# Patient Record
Sex: FEMALE | Race: WHITE | Employment: FULL TIME | URBAN - METROPOLITAN AREA
[De-identification: names, ages, dates, MRNs, and addresses within clinical notes are randomized per-mention and may not be internally consistent; named-entity substitution may affect disease eponyms.]

---

## 2017-11-01 ENCOUNTER — OFFICE VISIT (OUTPATIENT)
Dept: FAMILY MEDICINE | Facility: CLINIC | Age: 47
End: 2017-11-01
Payer: COMMERCIAL

## 2017-11-01 VITALS
BODY MASS INDEX: 26.53 KG/M2 | WEIGHT: 155.4 LBS | TEMPERATURE: 98.6 F | HEART RATE: 55 BPM | DIASTOLIC BLOOD PRESSURE: 67 MMHG | HEIGHT: 64 IN | SYSTOLIC BLOOD PRESSURE: 106 MMHG | OXYGEN SATURATION: 98 %

## 2017-11-01 DIAGNOSIS — H65.02 ACUTE SEROUS OTITIS MEDIA OF LEFT EAR, RECURRENCE NOT SPECIFIED: Primary | ICD-10-CM

## 2017-11-01 DIAGNOSIS — Z23 NEED FOR PROPHYLACTIC VACCINATION AND INOCULATION AGAINST INFLUENZA: ICD-10-CM

## 2017-11-01 PROCEDURE — 99213 OFFICE O/P EST LOW 20 MIN: CPT | Mod: 25 | Performed by: NURSE PRACTITIONER

## 2017-11-01 PROCEDURE — 90471 IMMUNIZATION ADMIN: CPT | Performed by: NURSE PRACTITIONER

## 2017-11-01 PROCEDURE — 90686 IIV4 VACC NO PRSV 0.5 ML IM: CPT | Performed by: NURSE PRACTITIONER

## 2017-11-01 RX ORDER — FLUTICASONE PROPIONATE 50 MCG
1 SPRAY, SUSPENSION (ML) NASAL 2 TIMES DAILY
Qty: 1 BOTTLE | Refills: 11 | Status: SHIPPED | OUTPATIENT
Start: 2017-11-01 | End: 2019-05-07

## 2017-11-01 NOTE — MR AVS SNAPSHOT
After Visit Summary   11/1/2017    Roxy Ross    MRN: [id number]           Patient Information     Date Of Birth          1970        Visit Information        Provider Department      11/1/2017 1:20 PM Camille Mackenzie APRN [facility]        Today's Diagnoses     Acute serous otitis media of left ear, recurrence not specified    -  1      Care Instructions      Common Middle Ear Problems    Your middle ear may have been injured or infected recently. Over time, certain growths or bone disease can also harm the middle ear. Left untreated, middle ear problems often lead to lifelong hearing loss. There are two types of hearing loss: conductive and sensorineural. One or both kinds can occur. Injury, infection, certain growths, or bone disease can cause your symptoms. A ruptured eardrum or a long-lasting (chronic) ear infection may be painful and decrease hearing.  Symptoms    Hearing loss in one or both ears    Fluid, often smelly, draining from the ear    Pain, pressure, or discomfort in the ear    Ringing in the ear  Conductive and sensorineural hearing loss  Sound waves may be disrupted before they reach the inner ear. If this happens, conductive hearing loss may occur. The ear canal can be blocked by wax, infection, a tumor, or a foreign object. The eardrum can be injured or infected. Abnormal bone growth, infection, or tumors in the middle ear can block sound waves.  Sound waves may not be processed correctly in the inner ear. If this happens, sensorineural hearing loss may occur. Permanent hearing loss is most commonly associated with sensorineural problems.  The tests and evaluations used to diagnose what type of hearing problem you have will depend on your symptoms.   Date Last Reviewed: 10/1/2016    1299-9468 [employer]. [address] [place] [address]. All rights reserved. This information is not intended as a substitute for  professional medical care. Always follow your healthcare professional's instructions.                Follow-ups after your visit        Additional Services     OTOLARYNGOLOGY REFERRAL       Your provider has referred you to: KJ: [facility] [phone]   [email or web address]    Please be aware that coverage of these services is subject to the terms and limitations of your health insurance plan.  Call member services at your health plan with any benefit or coverage questions.      Please bring the following with you to your appointment:    (1) Any X-Rays, CTs or MRIs which have been performed.  Contact the facility where they were done to arrange for  prior to your scheduled appointment.   (2) List of current medications  (3) This referral request   (4) Any documents/labs given to you for this referral                  Who to contact     If you have questions or need follow up information about today's clinic visit or your schedule please contact [facility] directly at [phone].  Normal or non-critical lab and imaging results will be communicated to you by Zondlehart, letter or phone within 4 business days after the clinic has received the results. If you do not hear from us within 7 days, please contact the clinic through My Ad Boxt or phone. If you have a critical or abnormal lab result, we will notify you by phone as soon as possible.  Submit refill requests through [employer] L[employer] or call your pharmacy and they will forward the refill request to us. Please allow 3 business days for your refill to be completed.          Additional Information About Your Visit        Buzz Lanes Information     [employer] gives you secure access to your electronic health record. If you see a primary care provider, you can also send messages to your care team and make appointments. If you have questions, please call your primary care clinic.  If you do not have a primary care  "provider, please call [phone] and they will assist you.        Care EveryWhere ID     This is your Care EveryWhere ID. This could be used by other organizations to access your [place] medical records  [id number]        Your Vitals Were     Pulse Temperature Height Pulse Oximetry BMI (Body Mass Index)       55 98.6  F (37  C) (Tympanic) 5' 4\" (1.626 m) 98% 26.67 kg/m2        Blood Pressure from Last 3 Encounters:   11/01/17 106/67   12/08/16 120/72   11/29/16 136/83    Weight from Last 3 Encounters:   11/01/17 155 lb 6.4 oz (70.5 kg)   12/08/16 155 lb (70.3 kg)   11/29/16 156 lb 9.6 oz (71 kg)              We Performed the Following     OTOLARYNGOLOGY REFERRAL          Today's Medication Changes          These changes are accurate as of: 11/1/17  1:46 PM.  If you have any questions, ask your nurse or doctor.               Start taking these medicines.        Dose/Directions    fluticasone 50 MCG/ACT spray   Commonly known as:  FLONASE   Used for:  Acute serous otitis media of left ear, recurrence not specified   Started by:  Camille Mackenzie APRN CNP        Dose:  1 spray   Spray 1 spray into both nostrils 2 times daily Left nostril twice daily.   Quantity:  1 Bottle   Refills:  11            Where to get your medicines      These medications were sent to [facility] [address] [place] [address] [address] AT [facility] OF [address] [place] [address]  [address] [address]     Phone:  [phone]     fluticasone 50 MCG/ACT spray                Primary Care Provider Office Phone # Fax #    Salima Junior [phone]       [address] [place] [address]        Equal Access to Services     MIGUELITO PAGAN AH: Adrianna Al, waaxda luqadaha, qaybta kaalmada savannahyaisaak, bud garza. So [facility] [phone].    ATENCIÓN: Si habla español, tiene a osborne disposición servicios gratuitos de asistencia lingüística. Llame al " [phone].    We comply with applicable federal civil rights laws and [place] laws. We do not discriminate on the basis of race, color, national origin, age, disability, sex, sexual orientation, or gender identity.            Thank you!     Thank you for choosing [facility]  for your care. Our goal is always to provide you with excellent care. Hearing back from our patients is one way we can continue to improve our services. Please take a few minutes to complete the written survey that you may receive in the mail after your visit with us. Thank you!             Your Updated Medication List - Protect others around you: Learn how to safely use, store and throw away your medicines at www.disposemymeds.org.          This list is accurate as of: 11/1/17  1:46 PM.  Always use your most recent med list.                   Brand Name Dispense Instructions for use Diagnosis    fluticasone 50 MCG/ACT spray    FLONASE    1 Bottle    Spray 1 spray into both nostrils 2 times daily Left nostril twice daily.    Acute serous otitis media of left ear, recurrence not specified       NO ACTIVE MEDICATIONS      .    Brachial neuritis or radiculitis NOS

## 2017-11-01 NOTE — PROGRESS NOTES

## 2017-11-01 NOTE — PATIENT INSTRUCTIONS
Common Middle Ear Problems    Your middle ear may have been injured or infected recently. Over time, certain growths or bone disease can also harm the middle ear. Left untreated, middle ear problems often lead to lifelong hearing loss. There are two types of hearing loss: conductive and sensorineural. One or both kinds can occur. Injury, infection, certain growths, or bone disease can cause your symptoms. A ruptured eardrum or a long-lasting (chronic) ear infection may be painful and decrease hearing.  Symptoms    Hearing loss in one or both ears    Fluid, often smelly, draining from the ear    Pain, pressure, or discomfort in the ear    Ringing in the ear  Conductive and sensorineural hearing loss  Sound waves may be disrupted before they reach the inner ear. If this happens, conductive hearing loss may occur. The ear canal can be blocked by wax, infection, a tumor, or a foreign object. The eardrum can be injured or infected. Abnormal bone growth, infection, or tumors in the middle ear can block sound waves.  Sound waves may not be processed correctly in the inner ear. If this happens, sensorineural hearing loss may occur. Permanent hearing loss is most commonly associated with sensorineural problems.  The tests and evaluations used to diagnose what type of hearing problem you have will depend on your symptoms.   Date Last Reviewed: 10/1/2016    [id number] [employer]. [address] [place] [address], [place] [address]. All rights reserved. This information is not intended as a substitute for professional medical care. Always follow your healthcare professional's instructions.

## 2017-11-01 NOTE — PROGRESS NOTES
SUBJECTIVE:   Roxy Ross is a 47 year old female who presents to clinic today for the following health issues:      ENT Symptoms             Symptoms: cc Present Absent Comment   Fever/Chills   x    Fatigue   x    Muscle Aches   x    Eye Irritation   x    Sneezing   x    Nasal Roberth/Drg   x    Sinus Pressure/Pain   x    Loss of smell   x    Dental pain   x    Sore Throat   x    Swollen Glands   x    Ear Pain/Fullness x   Left ear fullness, when it is quiet or at night she hears a whooshing noise with her pulse. Denies and pain or drainage.   Cough   x    Wheeze   x    Chest Pain   x    Shortness of breath   x    Rash       Other         Symptom duration:  1 1/2 weeks   Symptom severity:  mild    Treatments tried:  none   Contacts:  none       Problem list and histories reviewed & adjusted, as indicated.  Additional history: as documented    Patient Active Problem List   Diagnosis     External hemorrhoids     Family history of colon cancer     CARDIOVASCULAR SCREENING; LDL GOAL LESS THAN 160     Past Surgical History:   Procedure Laterality Date     C TOTAL ABDOM HYSTERECTOMY  5/03    Hysterectomy, Total Abdominal, fibroid     COLONOSCOPY  4/11/2011    Procedure:COLONOSCOPY; Surgeon:HECTOR CONTRERAS; Location:[facility]     HC DILATION/CURETTAGE DIAG/THER NON OB  1996    D & C     HYSTERECTOMY, PAP NO LONGER INDICATED       LAPAROSCOPIC CYSTECTOMY OVARIAN (BENIGN)  1/4/2012    Procedure:LAPAROSCOPIC CYSTECTOMY OVARIAN (BENIGN); Final Procedure Done: Laparoscopy, Peritoneal Washing, Lysis of Adhesions; Surgeon:ADRIANA REYNOLDS; Location:[facility]     LAPAROSCOPY DIAGNOSTIC (GYN)  1/4/12    Lysis of adhesions, evaluate ovaries       Social History   Substance Use Topics     Smoking status: Former Smoker     Packs/day: 0.50     Years: 5.00     Types: Cigarettes     Quit date: 9/1/1991     Smokeless tobacco: Never Used     Alcohol use Yes      Comment: beer and wine 4 a week     Family History  "  Problem Relation Age of Onset     Hypertension Mother      Thyroid Disease Mother      Lipids Father      Cancer - colorectal Father      age 72     Allergies Maternal Grandmother      asthma     HEART DISEASE Paternal Grandmother      Allergies Brother      asthma     CANCER Paternal Grandfather      stomach             Reviewed and updated as needed this visit by clinical staffTobacco  Allergies  Med Hx  Surg Hx  Fam Hx  Soc Hx      Reviewed and updated as needed this visit by Provider         ROS:  Constitutional, HEENT, cardiovascular, pulmonary, gi and gu systems are negative, except as otherwise noted.      OBJECTIVE:   /67 (BP Location: Left arm, Patient Position: Sitting, Cuff Size: Adult Regular)  Pulse 55  Temp 98.6  F (37  C) (Tympanic)  Ht 5' 4\" (1.626 m)  Wt 155 lb 6.4 oz (70.5 kg)  SpO2 98%  BMI 26.67 kg/m2  Body mass index is 26.67 kg/(m^2).  GENERAL: healthy, alert and no distress  EYES: Eyes grossly normal to inspection, PERRL and conjunctivae and sclerae normal  HENT: normal cephalic/atraumatic, right ear: normal: no effusions, no erythema, normal landmarks, left ear: clear effusion and bulging membrane, nose and mouth without ulcers or lesions, nasal mucosa edematous on left nare, rhinorrhea clear, oropharynx clear and oral mucous membranes moist  NECK: no adenopathy, no asymmetry, masses, or scars and thyroid normal to palpation  RESP: lungs clear to auscultation - no rales, rhonchi or wheezes  CV: regular rates and rhythm, normal S1 S2, no S3 or S4, no murmur, click or rub, no peripheral edema and no bruits heard - carotid.  MS: no gross musculoskeletal defects noted, no edema  SKIN: no suspicious lesions or rashes  NEURO: Normal strength and tone, sensory exam grossly normal, mentation intact, speech normal, cranial nerves 2-12 intact, DTR's normal and symmetric 2+, gait normal and Romberg normal.    Diagnostic Test Results:  none     ASSESSMENT/PLAN:       ICD-10-CM    1. " Acute serous otitis media of left ear, recurrence not specified H65.02 fluticasone (FLONASE) 50 MCG/ACT spray     OTOLARYNGOLOGY REFERRAL   2. Need for prophylactic vaccination and inoculation against influenza Z23 FLU VAC, SPLIT VIRUS IM > 3 YO (QUADRIVALENT) [18040]     Vaccine Administration, Initial [20332]       CONSULTATION/REFERRAL to ENT for persistent symptoms. RETURN TO CLINIC for new or worsening symptoms.     Patient Instructions     Common Middle Ear Problems    Your middle ear may have been injured or infected recently. Over time, certain growths or bone disease can also harm the middle ear. Left untreated, middle ear problems often lead to lifelong hearing loss. There are two types of hearing loss: conductive and sensorineural. One or both kinds can occur. Injury, infection, certain growths, or bone disease can cause your symptoms. A ruptured eardrum or a long-lasting (chronic) ear infection may be painful and decrease hearing.  Symptoms    Hearing loss in one or both ears    Fluid, often smelly, draining from the ear    Pain, pressure, or discomfort in the ear    Ringing in the ear  Conductive and sensorineural hearing loss  Sound waves may be disrupted before they reach the inner ear. If this happens, conductive hearing loss may occur. The ear canal can be blocked by wax, infection, a tumor, or a foreign object. The eardrum can be injured or infected. Abnormal bone growth, infection, or tumors in the middle ear can block sound waves.  Sound waves may not be processed correctly in the inner ear. If this happens, sensorineural hearing loss may occur. Permanent hearing loss is most commonly associated with sensorineural problems.  The tests and evaluations used to diagnose what type of hearing problem you have will depend on your symptoms.   Date Last Reviewed: 10/1/2016    [id number] [employer]. [address] [place] [address], [place] [address]. All rights reserved. This information is not  intended as a substitute for professional medical care. Always follow your healthcare professional's instructions.            RUBÉN Ellison [facility]

## 2017-11-01 NOTE — NURSING NOTE
"Chief Complaint   Patient presents with     Ear Problem       Initial /67 (BP Location: Left arm, Patient Position: Sitting, Cuff Size: Adult Regular)  Pulse 55  Temp 98.6  F (37  C) (Tympanic)  Ht 5' 4\" (1.626 m)  Wt 155 lb 6.4 oz (70.5 kg)  SpO2 98%  BMI 26.67 kg/m2 Estimated body mass index is 26.67 kg/(m^2) as calculated from the following:    Height as of this encounter: 5' 4\" (1.626 m).    Weight as of this encounter: 155 lb 6.4 oz (70.5 kg).  Medication Reconciliation: complete  "

## 2018-04-12 ENCOUNTER — OFFICE VISIT (OUTPATIENT)
Dept: FAMILY MEDICINE | Facility: CLINIC | Age: 48
End: 2018-04-12
Payer: COMMERCIAL

## 2018-04-12 VITALS
RESPIRATION RATE: 16 BRPM | HEIGHT: 65 IN | SYSTOLIC BLOOD PRESSURE: 96 MMHG | BODY MASS INDEX: 26.49 KG/M2 | HEART RATE: 56 BPM | DIASTOLIC BLOOD PRESSURE: 62 MMHG | TEMPERATURE: 98.9 F | WEIGHT: 159 LBS

## 2018-04-12 DIAGNOSIS — Z00.00 ROUTINE GENERAL MEDICAL EXAMINATION AT A HEALTH CARE FACILITY: Primary | ICD-10-CM

## 2018-04-12 DIAGNOSIS — Z13.6 CARDIOVASCULAR SCREENING; LDL GOAL LESS THAN 160: ICD-10-CM

## 2018-04-12 DIAGNOSIS — Z80.0 FAMILY HISTORY OF COLON CANCER: ICD-10-CM

## 2018-04-12 DIAGNOSIS — D23.4 BENIGN NEOPLASM OF SCALP AND SKIN OF NECK: ICD-10-CM

## 2018-04-12 DIAGNOSIS — K64.4 EXTERNAL HEMORRHOIDS: ICD-10-CM

## 2018-04-12 DIAGNOSIS — H93.12 TINNITUS, LEFT: ICD-10-CM

## 2018-04-12 LAB
CHOLEST SERPL-MCNC: 190 MG/DL
GLUCOSE SERPL-MCNC: 93 MG/DL (ref 70–99)
HDLC SERPL-MCNC: 76 MG/DL
LDLC SERPL CALC-MCNC: 102 MG/DL
NONHDLC SERPL-MCNC: 114 MG/DL
TRIGL SERPL-MCNC: 60 MG/DL

## 2018-04-12 PROCEDURE — 99396 PREV VISIT EST AGE 40-64: CPT | Performed by: FAMILY MEDICINE

## 2018-04-12 PROCEDURE — 36415 COLL VENOUS BLD VENIPUNCTURE: CPT | Performed by: FAMILY MEDICINE

## 2018-04-12 PROCEDURE — 99213 OFFICE O/P EST LOW 20 MIN: CPT | Mod: 25 | Performed by: FAMILY MEDICINE

## 2018-04-12 PROCEDURE — 82947 ASSAY GLUCOSE BLOOD QUANT: CPT | Performed by: FAMILY MEDICINE

## 2018-04-12 PROCEDURE — 80061 LIPID PANEL: CPT | Performed by: FAMILY MEDICINE

## 2018-04-12 NOTE — NURSING NOTE
"Chief Complaint   Patient presents with     Physical       Initial BP 96/62  Pulse 56  Temp 98.9  F (37.2  C) (Tympanic)  Resp 16  Ht 5' 4.5\" (1.638 m)  Wt 159 lb (72.1 kg)  BMI 26.87 kg/m2 Estimated body mass index is 26.87 kg/(m^2) as calculated from the following:    Height as of this encounter: 5' 4.5\" (1.638 m).    Weight as of this encounter: 159 lb (72.1 kg).  Medication Reconciliation: complete    "

## 2018-04-12 NOTE — MR AVS SNAPSHOT
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 Review health changes.   2. Discuss preventive care.    3. Review your medicines if your doctor prescribed any.      Get a Pap test every three years (unless you have an abnormal result and your provider advises testing more often).      If you get Pap tests with HPV test, you only need to test every 5 years, unless you have an abnormal result. You do not need a Pap test if your uterus was removed (hysterectomy) and you have not had cancer.      You should be tested each year for STDs (sexually transmitted diseases), if you're at risk.       Ask your doctor if you should have a mammogram.      Have a colonoscopy (test for colon cancer) if someone in your family has had colon cancer or polyps before age 50.       Have a cholesterol test every 5 years.       Have a diabetes test (fasting glucose) after age 45. If you are at risk for diabetes, you should have this test every 3 years.    Shots: Get a flu shot each year. Get a tetanus shot every 10 years.     Nutrition:     Eat at least 5 servings of fruits and vegetables each day.    Eat whole-grain bread, whole-wheat pasta and brown rice instead of white grains and  rice.    Talk to your provider about Calcium and Vitamin D.     Lifestyle    Exercise at least 150 minutes a week (an average of 30 minutes a day, 5 days a week). This will help you control your weight and prevent disease.    Limit alcohol to one drink per day.    No smoking.     Wear sunscreen to prevent skin cancer.    See your dentist every six months for an exam and cleaning.          Follow-ups after your visit        Additional Services     COLORECTAL SURGERY REFERRAL       Your provider has referred you to: [facility]: Colon and Rectal Surgery Clinic [facility] [phone]   [email or web address]    Referral Reason(s): Hemorrhoids  Special Concerns: None  This referral is: Elective (week +)  It is OK to leave a message on patient's voicemail.    Please be aware that coverage of these services is subject to the terms and limitations of your health insurance plan.  Call member services at your health plan with any benefit or coverage questions.      Please bring the following with you to your appointment:    (1) Any X-Rays, CTs or MRIs which have been performed.  Contact the facility where they were done to arrange for  prior to your scheduled appointment.    (2) List of current medications  (3) This referral request   (4) Any documents/labs given to you for this referral            GASTROENTEROLOGY ADULT REF PROCEDURE ONLY       Last Lab Result: No results found for: CR  Body mass index is 26.87 kg/(m^2).     Needed:  No  Language:  English    Patient will be contacted to schedule procedure.     Please be aware that coverage of these services is subject to the terms and limitations of your health insurance plan.  Call member services at your health plan with any benefit or coverage questions.  Any procedures must be performed at a [facility] facility OR coordinated by your clinic's referral office.    Please bring the following with you to your  "appointment:    (1) Any X-Rays, CTs or MRIs which have been performed.  Contact the facility where they were done to arrange for  prior to your scheduled appointment.    (2) List of current medications   (3) This referral request   (4) Any documents/labs given to you for this referral                  Who to contact     If you have questions or need follow up information about today's clinic visit or your schedule please contact [facility] directly at [phone].  Normal or non-critical lab and imaging results will be communicated to you by Source Audiohart, letter or phone within 4 business days after the clinic has received the results. If you do not hear from us within 7 days, please contact the clinic through [employer] or phone. If you have a critical or abnormal lab result, we will notify you by phone as soon as possible.  Submit refill requests through BigEvidence or call your pharmacy and they will forward the refill request to us. Please allow 3 business days for your refill to be completed.          Additional Information About Your Visit        Source [employer] Information     BigEvidence gives you secure access to your electronic health record. If you see a primary care provider, you can also send messages to your care team and make appointments. If you have questions, please call your primary care clinic.  If you do not have a primary care provider, please call [phone] and they will assist you.        Care EveryWhere ID     This is your Care EveryWhere ID. This could be used by other organizations to access your [place] medical records  [id number]        Your Vitals Were     Pulse Temperature Respirations Height BMI (Body Mass Index)       56 98.9  F (37.2  C) (Tympanic) 16 5' 4.5\" (1.638 m) 26.87 kg/m2        Blood Pressure from Last 3 Encounters:   04/12/18 96/62   11/01/17 106/67   12/08/16 120/72    Weight from Last 3 Encounters:   04/12/18 159 lb (72.1 kg)   11/01/17 155 lb 6.4 oz (70.5 kg) "   12/08/16 155 lb (70.3 kg)              We Performed the Following     COLORECTAL SURGERY REFERRAL     GASTROENTEROLOGY ADULT REF PROCEDURE ONLY     Glucose     Lipid panel reflex to direct LDL Non-fasting        Primary Care Provider Office Phone # Fax #    Salima Junior [phone] [address] [facility] [address]        Equal Access to Services     HAMIDA PAGAN : Hadii aad ku hadasho Soomaali, waaxda luqadaha, qaybta kaalmada adeegyada, waxay idiin hayaan adeeg kharash la'aan . So [facility] [phone].    ATENCIÓN: Si habla español, tiene a osborne disposición servicios gratuitos de asistencia lingüística. Llame al [phone].    We comply with applicable federal civil rights laws and [place] laws. We do not discriminate on the basis of race, color, national origin, age, disability, sex, sexual orientation, or gender identity.            Thank you!     Thank you for choosing [facility]  for your care. Our goal is always to provide you with excellent care. Hearing back from our patients is one way we can continue to improve our services. Please take a few minutes to complete the written survey that you may receive in the mail after your visit with us. Thank you!             Your Updated Medication List - Protect others around you: Learn how to safely use, store and throw away your medicines at www.disposemymeds.org.          This list is accurate as of 4/12/18  9:28 AM.  Always use your most recent med list.                   Brand Name Dispense Instructions for use Diagnosis    fluticasone 50 MCG/ACT spray    FLONASE    1 Bottle    Spray 1 spray into both nostrils 2 times daily Left nostril twice daily.    Acute serous otitis media of left ear, recurrence not specified       NO ACTIVE MEDICATIONS      .    Brachial neuritis or radiculitis NOS

## 2018-04-12 NOTE — PROGRESS NOTES
SUBJECTIVE:   CC: Roxy Ross is an 47 year old woman who presents for preventive health visit.     Healthy Habits:    Do you get at least three servings of calcium containing foods daily (dairy, green leafy vegetables, etc.)? yes    Amount of exercise or daily activities, outside of work: 0 day(s) per week    Problems taking medications regularly No    Medication side effects: No    Have you had an eye exam in the past two years? yes    Do you see a dentist twice per year? yes    Do you have sleep apnea, excessive snoring or daytime drowsiness?no    Concerns: Whooshing in left ear. Tried flonase, initially helped.     Hemorrhoid bothering her, patient wants to get rid of it.     Mole on scalp long time, maybe changing    Today's PHQ-2 Score:   PHQ-2 ( 1999 Pf[employer]) 4/12/2018 6/24/2016   Q1: Little interest or pleasure in doing things 0 0   Q2: Feeling down, depressed or hopeless 0 0   PHQ-2 Score 0 0       Abuse: Current or Past(Physical, Sexual or Emotional)- No  Do you feel safe in your environment - Yes    Social History   Substance Use Topics     Smoking status: Former Smoker     Packs/day: 0.50     Years: 5.00     Types: Cigarettes     Quit date: 9/1/1991     Smokeless tobacco: Never Used     Alcohol use Yes      Comment: beer and wine 4 a week     If you drink alcohol do you typically have >3 drinks per day or >7 drinks per week? No                     Reviewed orders with patient.  Reviewed health maintenance and updated orders accordingly - Yes  BP Readings from Last 3 Encounters:   04/12/18 96/62   11/01/17 106/67   12/08/16 120/72    Wt Readings from Last 3 Encounters:   04/12/18 159 lb (72.1 kg)   11/01/17 155 lb 6.4 oz (70.5 kg)   12/08/16 155 lb (70.3 kg)                    Patient over age 50, mutual decision to screen reflected in health maintenance.    Pertinent mammograms are reviewed under the imaging tab.  History of abnormal Pap smear: Status post benign hysterectomy. Health  Maintenance and Surgical History updated.    Reviewed and updated as needed this visit by clinical staff  Tobacco  Allergies  Meds  Problems  Med Hx  Surg Hx  Fam Hx  Soc Hx          Reviewed and updated as needed this visit by Provider  Allergies  Meds  Problems  Med Hx        Past Medical History:   Diagnosis Date     Endometriosis     seen in [place] at time of hyst     Fibroid tumors     s/p MERLYN     Ovarian cyst       Past Surgical History:   Procedure Laterality Date     C TOTAL ABDOM HYSTERECTOMY      Hysterectomy, Total Abdominal, fibroid     COLONOSCOPY  2011    Procedure:COLONOSCOPY; Surgeon:HECTOR CONTRERAS; Location:[facility]     HC DILATION/CURETTAGE DIAG/THER NON OB      D & C     HYSTERECTOMY, PAP NO LONGER INDICATED       LAPAROSCOPIC CYSTECTOMY OVARIAN (BENIGN)  2012    Procedure:LAPAROSCOPIC CYSTECTOMY OVARIAN (BENIGN); Final Procedure Done: Laparoscopy, Peritoneal Washing, Lysis of Adhesions; Surgeon:ADRIANA REYNOLDS; [facility]     LAPAROSCOPY DIAGNOSTIC (GYN)  12    Lysis of adhesions, evaluate ovaries     Obstetric History       T2      L3     SAB1   TAB0   Ectopic0   Multiple2   Live Births0       # Outcome Date GA Lbr Poncho/2nd Weight Sex Delivery Anes PTL Lv   3 SAB            2 Term            1 Term               Obstetric Comments   Cs twins (invetro fertilization) ,         ROS:  C: NEGATIVE for fever, chills, change in weight  INTEGUMENTARY/SKIN: as above  E: NEGATIVE for vision changes or irritation  ENT: as above  R: NEGATIVE for significant cough or SOB  B: NEGATIVE for masses, tenderness or discharge  CV: NEGATIVE for chest pain, palpitations or peripheral edema  GI: NEGATIVE for nausea, abdominal pain, heartburn, or change in bowel habits  : NEGATIVE for unusual urinary or vaginal symptoms. Periods are regular.  M: NEGATIVE for significant arthralgias or myalgia  N: NEGATIVE for weakness, dizziness or  "paresthesias  P: NEGATIVE for changes in mood or affect    OBJECTIVE:   BP 96/62  Pulse 56  Temp 98.9  F (37.2  C) (Tympanic)  Resp 16  Ht 5' 4.5\" (1.638 m)  Wt 159 lb (72.1 kg)  BMI 26.87 kg/m2  EXAM:  GENERAL: healthy, alert and no distress  EYES: Eyes grossly normal to inspection, PERRL and conjunctivae and sclerae normal  HENT: ear canals and TM's normal, nose and mouth without ulcers or lesions  NECK: no adenopathy, no asymmetry, masses, or scars and thyroid normal to palpation  RESP: lungs clear to auscultation - no rales, rhonchi or wheezes  BREAST: normal without masses, tenderness or nipple discharge and no palpable axillary masses or adenopathy  CV: regular rate and rhythm, normal S1 S2, no S3 or S4, no murmur, click or rub, no peripheral edema and peripheral pulses strong  ABDOMEN: soft, nontender, no hepatosplenomegaly, no masses and bowel sounds normal   (female): normal female external genitalia, normal urethral meatus , adnexa without mass or tenderness and rectal exam shows a hemorrhoid tag  MS: no gross musculoskeletal defects noted, no edema  SKIN: no suspicious lesions or rashes, there is a large 1.2 cm mole with hair on the scalp  NEURO: Normal strength and tone, mentation intact and speech normal  PSYCH: mentation appears normal, affect normal/bright    ASSESSMENT/PLAN:   Roxy was seen today for physical.    Diagnoses and all orders for this visit:    Routine general medical examination at a health care facility  -     Glucose    External hemorrhoids  -     COLORECTAL SURGERY REFERRAL    Tinnitus, left  Patient education handout given  Offered to have her see ENT, she declines    Benign neoplasm of scalp and skin of neck  Recommend shave biopsy     Family history of colon cancer  -     GASTROENTEROLOGY ADULT REF PROCEDURE ONLY    CARDIOVASCULAR SCREENING; LDL GOAL LESS THAN 160  -     Lipid panel reflex to direct LDL Non-fasting    Other orders  -     Cancel: GLUCOSE    See me back " "for mole removal    See colorectal surgeon    Get a colonoscopy    COUNSELING:   Reviewed preventive health counseling, as reflected in patient instructions       Regular exercise       Healthy diet/nutrition       Vision screening       Hearing screening       Osteoporosis Prevention/Bone Health         reports that she quit smoking about 26 years ago. Her smoking use included Cigarettes. She has a 2.50 pack-year smoking history. She has never used smokeless tobacco.    Estimated body mass index is 26.87 kg/(m^2) as calculated from the following:    Height as of this encounter: 5' 4.5\" (1.638 m).    Weight as of this encounter: 159 lb (72.1 kg).       Counseling Resources:  ATP IV Guidelines  Pooled Cohorts Equation Calculator  Breast Cancer Risk Calculator  FRAX Risk Assessment  ICSI Preventive Guidelines  Dietary Guidelines for Americans, 2010  USDA's MyPlate  ASA Prophylaxis  Lung CA Screening    Salima Junior MD  [facility]  "

## 2018-04-12 NOTE — PATIENT INSTRUCTIONS
See me back for mole removal    See colorectal surgeon    Get a colonoscopy        Preventive Health Recommendations  Female Ages 40 to 49    Yearly exam:     See your health care provider every year in order to  1. Review health changes.   2. Discuss preventive care.    3. Review your medicines if your doctor prescribed any.      Get a Pap test every three years (unless you have an abnormal result and your provider advises testing more often).      If you get Pap tests with HPV test, you only need to test every 5 years, unless you have an abnormal result. You do not need a Pap test if your uterus was removed (hysterectomy) and you have not had cancer.      You should be tested each year for STDs (sexually transmitted diseases), if you're at risk.       Ask your doctor if you should have a mammogram.      Have a colonoscopy (test for colon cancer) if someone in your family has had colon cancer or polyps before age 50.       Have a cholesterol test every 5 years.       Have a diabetes test (fasting glucose) after age 45. If you are at risk for diabetes, you should have this test every 3 years.    Shots: Get a flu shot each year. Get a tetanus shot every 10 years.     Nutrition:     Eat at least 5 servings of fruits and vegetables each day.    Eat whole-grain bread, whole-wheat pasta and brown rice instead of white grains and rice.    Talk to your provider about Calcium and Vitamin D.     Lifestyle    Exercise at least 150 minutes a week (an average of 30 minutes a day, 5 days a week). This will help you control your weight and prevent disease.    Limit alcohol to one drink per day.    No smoking.     Wear sunscreen to prevent skin cancer.    See your dentist every six months for an exam and cleaning.

## 2018-09-10 ENCOUNTER — ANESTHESIA EVENT (OUTPATIENT)
Dept: GASTROENTEROLOGY | Facility: CLINIC | Age: 48
End: 2018-09-10
Payer: COMMERCIAL

## 2018-09-10 ASSESSMENT — LIFESTYLE VARIABLES: TOBACCO_USE: 1

## 2018-09-10 NOTE — ANESTHESIA PREPROCEDURE EVALUATION
Anesthesia Evaluation     . Pt has had prior anesthetic. Type: General and MAC    No history of anesthetic complications          ROS/MED HX    ENT/Pulmonary:     (+)tobacco use, Past use , . .    Neurologic:  - neg neurologic ROS     Cardiovascular:  - neg cardiovascular ROS       METS/Exercise Tolerance:  >4 METS   Hematologic:  - neg hematologic  ROS       Musculoskeletal:  - neg musculoskeletal ROS       GI/Hepatic:  - neg GI/hepatic ROS       Renal/Genitourinary:  - ROS Renal section negative       Endo:  - neg endo ROS       Psychiatric:  - neg psychiatric ROS       Infectious Disease:  - neg infectious disease ROS       Malignancy:      - no malignancy   Other:    - neg other ROS                 Physical Exam  Normal systems: cardiovascular, pulmonary and dental    Airway   Mallampati: II  TM distance: >3 FB  Neck ROM: full    Dental     Cardiovascular   Rhythm and rate: regular and normal      Pulmonary    breath sounds clear to auscultation                    Anesthesia Plan      History & Physical Review  History and physical reviewed and following examination; no interval change.    ASA Status:  1 .    NPO Status:  > 6 hours    Plan for MAC and General with Propofol induction. Maintenance will be TIVA.  Reason for MAC:  Deep or markedly invasive procedure (G8)         Postoperative Care      Consents  Anesthetic plan, risks, benefits and alternatives discussed with:  Patient..                          .

## 2018-09-11 ENCOUNTER — SURGERY (OUTPATIENT)
Age: 48
End: 2018-09-11

## 2018-09-11 ENCOUNTER — HOSPITAL ENCOUNTER (OUTPATIENT)
Facility: CLINIC | Age: 48
Discharge: HOME | End: 2018-09-11
Attending: SURGERY | Admitting: SURGERY
Payer: COMMERCIAL

## 2018-09-11 ENCOUNTER — ANESTHESIA (OUTPATIENT)
Dept: GASTROENTEROLOGY | Facility: CLINIC | Age: 48
End: 2018-09-11
Payer: COMMERCIAL

## 2018-09-11 VITALS
RESPIRATION RATE: 18 BRPM | TEMPERATURE: 98.2 F | DIASTOLIC BLOOD PRESSURE: 65 MMHG | SYSTOLIC BLOOD PRESSURE: 125 MMHG | WEIGHT: 160 LBS | BODY MASS INDEX: 27.31 KG/M2 | OXYGEN SATURATION: 100 % | HEART RATE: 59 BPM | HEIGHT: 64 IN

## 2018-09-11 LAB — COLONOSCOPY: NORMAL

## 2018-09-11 PROCEDURE — 25000125 ZZHC RX 250: Performed by: SURGERY

## 2018-09-11 PROCEDURE — G0105 COLORECTAL SCRN; HI RISK IND: HCPCS | Performed by: SURGERY

## 2018-09-11 PROCEDURE — 25000128 H RX IP 250 OP 636: Performed by: SURGERY

## 2018-09-11 PROCEDURE — 45378 DIAGNOSTIC COLONOSCOPY: CPT | Performed by: SURGERY

## 2018-09-11 PROCEDURE — 25000128 H RX IP 250 OP 636: Performed by: NURSE ANESTHETIST, CERTIFIED REGISTERED

## 2018-09-11 PROCEDURE — 37000008 ZZH ANESTHESIA TECHNICAL FEE, 1ST 30 MIN: Performed by: SURGERY

## 2018-09-11 PROCEDURE — G0121 COLON CA SCRN NOT HI RSK IND: HCPCS | Performed by: SURGERY

## 2018-09-11 RX ORDER — SODIUM CHLORIDE, SODIUM LACTATE, POTASSIUM CHLORIDE, CALCIUM CHLORIDE 600; 310; 30; 20 MG/100ML; MG/100ML; MG/100ML; MG/100ML
INJECTION, SOLUTION INTRAVENOUS CONTINUOUS
Status: DISCONTINUED | OUTPATIENT
Start: 2018-09-11 | End: 2018-09-11 | Stop reason: HOSPADM

## 2018-09-11 RX ORDER — PROPOFOL 10 MG/ML
INJECTION, EMULSION INTRAVENOUS CONTINUOUS PRN
Status: DISCONTINUED | OUTPATIENT
Start: 2018-09-11 | End: 2018-09-11

## 2018-09-11 RX ORDER — ONDANSETRON 2 MG/ML
4 INJECTION INTRAMUSCULAR; INTRAVENOUS
Status: DISCONTINUED | OUTPATIENT
Start: 2018-09-11 | End: 2018-09-11 | Stop reason: HOSPADM

## 2018-09-11 RX ORDER — LIDOCAINE 40 MG/G
CREAM TOPICAL
Status: DISCONTINUED | OUTPATIENT
Start: 2018-09-11 | End: 2018-09-11 | Stop reason: HOSPADM

## 2018-09-11 RX ORDER — PROPOFOL 10 MG/ML
INJECTION, EMULSION INTRAVENOUS PRN
Status: DISCONTINUED | OUTPATIENT
Start: 2018-09-11 | End: 2018-09-11

## 2018-09-11 RX ADMIN — SODIUM CHLORIDE, POTASSIUM CHLORIDE, SODIUM LACTATE AND CALCIUM CHLORIDE: 600; 310; 30; 20 INJECTION, SOLUTION INTRAVENOUS at 10:29

## 2018-09-11 RX ADMIN — PROPOFOL 50 MG: 10 INJECTION, EMULSION INTRAVENOUS at 11:40

## 2018-09-11 RX ADMIN — PROPOFOL 100 MG: 10 INJECTION, EMULSION INTRAVENOUS at 11:28

## 2018-09-11 RX ADMIN — SODIUM CHLORIDE, POTASSIUM CHLORIDE, SODIUM LACTATE AND CALCIUM CHLORIDE: 600; 310; 30; 20 INJECTION, SOLUTION INTRAVENOUS at 11:50

## 2018-09-11 RX ADMIN — LIDOCAINE HYDROCHLORIDE 5 ML: 10 INJECTION, SOLUTION EPIDURAL; INFILTRATION; INTRACAUDAL; PERINEURAL at 10:29

## 2018-09-11 RX ADMIN — PROPOFOL 150 MCG/KG/MIN: 10 INJECTION, EMULSION INTRAVENOUS at 11:28

## 2018-09-11 RX ADMIN — PROPOFOL 50 MG: 10 INJECTION, EMULSION INTRAVENOUS at 11:35

## 2018-09-11 NOTE — ANESTHESIA CARE TRANSFER NOTE
Patient: Roxy Ross    Procedure(s):  colonoscopy - Wound Class: II-Clean Contaminated    Diagnosis: family history of     screening  Diagnosis Additional Information: No value filed.    Anesthesia Type:   MAC, General     Note:  Airway :Room Air  Patient transferred to:Phase II  Comments: Patient's VSS. Spontaneous respirations. Patient awake and oriented. IV patent. Report to RN.Handoff Report: Identifed the Patient, Identified the Reponsible Provider, Reviewed the pertinent medical history, Discussed the surgical course, Reviewed Intra-OP anesthesia mangement and issues during anesthesia, Set expectations for post-procedure period and Allowed opportunity for questions and acknowledgement of understanding      Vitals: (Last set prior to Anesthesia Care Transfer)    CRNA VITALS  9/11/2018 1121 - 9/11/2018 1151      9/11/2018             Pulse: 54    SpO2: 98 %                Electronically Signed By: RUBÉN Kang CRNA  September 11, 2018  11:51 AM

## 2018-09-11 NOTE — H&P
"48 year old year old female here for colonoscopy for family history of colon cancer.    Patient Active Problem List   Diagnosis     External hemorrhoids     Family history of colon cancer     CARDIOVASCULAR SCREENING; LDL GOAL LESS THAN 160       Past Medical History:   Diagnosis Date     Endometriosis 2003    seen in [place] at time of hyst     Fibroid tumors 2003    s/p MERLYN     Ovarian cyst        Past Surgical History:   Procedure Laterality Date     C TOTAL ABDOM HYSTERECTOMY  5/03    Hysterectomy, Total Abdominal, fibroid     COLONOSCOPY  4/11/2011    Procedure:COLONOSCOPY; Surgeon:HECTOR CONTRERAS; Location:[facility]     HC DILATION/CURETTAGE DIAG/THER NON OB  1996    D & C     HYSTERECTOMY, PAP NO LONGER INDICATED       LAPAROSCOPIC CYSTECTOMY OVARIAN (BENIGN)  1/4/2012    Procedure:LAPAROSCOPIC CYSTECTOMY OVARIAN (BENIGN); Final Procedure Done: Laparoscopy, Peritoneal Washing, Lysis of Adhesions; Surgeon:ADRIANA REYNOLDS; Location[facility]     LAPAROSCOPY DIAGNOSTIC (GYN)  1/4/12    Lysis of adhesions, evaluate ovaries       [unfilled]    No current outpatient prescriptions on file.       Allergies   Allergen Reactions     Erythromycin [Macrolides] Hives     hives       Pt reports that she quit smoking about 27 years ago. Her smoking use included Cigarettes. She has a 2.50 pack-year smoking history. She has never used smokeless tobacco. She reports that she drinks alcohol. She reports that she does not use illicit drugs.    Exam:  /74 (BP Location: Right arm)  Pulse 62  Temp 98.2  F (36.8  C) (Oral)  Resp 16  Ht 1.626 m (5' 4\")  Wt 72.6 kg (160 lb)  SpO2 98%  BMI 27.46 kg/m2    Awake, Alert OX3  Lungs - CTA bilaterally  CV - RRR, no murmurs, distal pulses intact  Abd - soft, non-distended, non-tender, +BS  Extr - No cyanosis or edema    A/P 48 year old year old female in need of colonoscopy for family history of colon cancer. Risks, benefits, alternatives, and complications were discussed " including the possibility of perforation and the patient agreed to proceed.    Ivet Oviedo MD

## 2018-09-11 NOTE — BRIEF OP NOTE
[facility]   Brief Operative Note    Pre-operative diagnosis: family history of     screening   Post-operative diagnosis normal colon     Procedure: Procedure(s):  colonoscopy - Wound Class: II-Clean Contaminated   Surgeon(s): Surgeon(s) and Role:     * Oliver Cooney MD - Primary   Estimated blood loss: * No values recorded between 9/11/2018 12:00 AM and 9/11/2018 11:49 AM *    Specimens: * No specimens in log *   Findings: Normal colon

## 2018-09-11 NOTE — ANESTHESIA POSTPROCEDURE EVALUATION
Patient: Roxy Ross    Procedure(s):  colonoscopy - Wound Class: II-Clean Contaminated    Diagnosis:family history of     screening  Diagnosis Additional Information: No value filed.    Anesthesia Type:  MAC, General    Note:  Anesthesia Post Evaluation    Patient location during evaluation: Phase 2 and Bedside  Patient participation: Able to fully participate in evaluation  Level of consciousness: awake and alert  Pain management: adequate  Airway patency: patent  Cardiovascular status: acceptable  Respiratory status: acceptable  Hydration status: acceptable  PONV: none     Anesthetic complications: None          Last vitals:  Vitals:    09/11/18 1011 09/11/18 1157   BP: 106/74 118/70   Pulse: 62 58   Resp: 16 16   Temp: 36.8  C (98.2  F)    SpO2: 98% 98%         Electronically Signed By: Suhas Hall CRNA, APRN CRNA  September 11, 2018  12:02 PM

## 2019-05-07 ENCOUNTER — OFFICE VISIT (OUTPATIENT)
Dept: FAMILY MEDICINE | Facility: CLINIC | Age: 49
End: 2019-05-07
Payer: COMMERCIAL

## 2019-05-07 ENCOUNTER — ANCILLARY PROCEDURE (OUTPATIENT)
Dept: GENERAL RADIOLOGY | Facility: CLINIC | Age: 49
End: 2019-05-07
Attending: NURSE PRACTITIONER
Payer: COMMERCIAL

## 2019-05-07 VITALS
DIASTOLIC BLOOD PRESSURE: 84 MMHG | HEART RATE: 50 BPM | TEMPERATURE: 97.9 F | WEIGHT: 151 LBS | BODY MASS INDEX: 25.78 KG/M2 | SYSTOLIC BLOOD PRESSURE: 120 MMHG | HEIGHT: 64 IN | RESPIRATION RATE: 16 BRPM

## 2019-05-07 DIAGNOSIS — M79.642 BILATERAL HAND PAIN: ICD-10-CM

## 2019-05-07 DIAGNOSIS — M79.672 BILATERAL FOOT PAIN: ICD-10-CM

## 2019-05-07 DIAGNOSIS — M79.671 BILATERAL FOOT PAIN: ICD-10-CM

## 2019-05-07 DIAGNOSIS — M79.641 BILATERAL HAND PAIN: Primary | ICD-10-CM

## 2019-05-07 DIAGNOSIS — M79.642 BILATERAL HAND PAIN: Primary | ICD-10-CM

## 2019-05-07 DIAGNOSIS — M79.641 BILATERAL HAND PAIN: ICD-10-CM

## 2019-05-07 LAB
BASOPHILS # BLD AUTO: 0 10E9/L (ref 0–0.2)
BASOPHILS NFR BLD AUTO: 0.7 %
CK SERPL-CCNC: 89 U/L (ref 30–225)
DIFFERENTIAL METHOD BLD: NORMAL
EOSINOPHIL # BLD AUTO: 0.1 10E9/L (ref 0–0.7)
EOSINOPHIL NFR BLD AUTO: 1.4 %
ERYTHROCYTE [DISTWIDTH] IN BLOOD BY AUTOMATED COUNT: 12.7 % (ref 10–15)
ERYTHROCYTE [SEDIMENTATION RATE] IN BLOOD BY WESTERGREN METHOD: 4 MM/H (ref 0–20)
HCT VFR BLD AUTO: 39.7 % (ref 35–47)
HGB BLD-MCNC: 13.2 G/DL (ref 11.7–15.7)
LYMPHOCYTES # BLD AUTO: 1.6 10E9/L (ref 0.8–5.3)
LYMPHOCYTES NFR BLD AUTO: 27.3 %
MCH RBC QN AUTO: 30.1 PG (ref 26.5–33)
MCHC RBC AUTO-ENTMCNC: 33.2 G/DL (ref 31.5–36.5)
MCV RBC AUTO: 91 FL (ref 78–100)
MONOCYTES # BLD AUTO: 0.5 10E9/L (ref 0–1.3)
MONOCYTES NFR BLD AUTO: 8.8 %
NEUTROPHILS # BLD AUTO: 3.7 10E9/L (ref 1.6–8.3)
NEUTROPHILS NFR BLD AUTO: 61.8 %
PLATELET # BLD AUTO: 164 10E9/L (ref 150–450)
RBC # BLD AUTO: 4.38 10E12/L (ref 3.8–5.2)
URATE SERPL-MCNC: 4.5 MG/DL (ref 2.6–6)
WBC # BLD AUTO: 5.9 10E9/L (ref 4–11)

## 2019-05-07 PROCEDURE — 86431 RHEUMATOID FACTOR QUANT: CPT | Performed by: NURSE PRACTITIONER

## 2019-05-07 PROCEDURE — 82550 ASSAY OF CK (CPK): CPT | Performed by: NURSE PRACTITIONER

## 2019-05-07 PROCEDURE — 73130 X-RAY EXAM OF HAND: CPT | Mod: RT

## 2019-05-07 PROCEDURE — 85025 COMPLETE CBC W/AUTO DIFF WBC: CPT | Performed by: NURSE PRACTITIONER

## 2019-05-07 PROCEDURE — 85652 RBC SED RATE AUTOMATED: CPT | Performed by: NURSE PRACTITIONER

## 2019-05-07 PROCEDURE — 99214 OFFICE O/P EST MOD 30 MIN: CPT | Performed by: NURSE PRACTITIONER

## 2019-05-07 PROCEDURE — 84550 ASSAY OF BLOOD/URIC ACID: CPT | Performed by: NURSE PRACTITIONER

## 2019-05-07 PROCEDURE — 86618 LYME DISEASE ANTIBODY: CPT | Performed by: NURSE PRACTITIONER

## 2019-05-07 PROCEDURE — 86038 ANTINUCLEAR ANTIBODIES: CPT | Performed by: NURSE PRACTITIONER

## 2019-05-07 PROCEDURE — 36415 COLL VENOUS BLD VENIPUNCTURE: CPT | Performed by: NURSE PRACTITIONER

## 2019-05-07 PROCEDURE — 86200 CCP ANTIBODY: CPT | Performed by: NURSE PRACTITIONER

## 2019-05-07 ASSESSMENT — MIFFLIN-ST. JEOR: SCORE: 1299.93

## 2019-05-07 NOTE — NURSING NOTE
"Chief Complaint   Patient presents with     Musculoskeletal Problem       Initial /84   Pulse 50   Temp 97.9  F (36.6  C) (Tympanic)   Resp 16   Ht 1.626 m (5' 4\")   Wt 68.5 kg (151 lb)   Breastfeeding? No   BMI 25.92 kg/m   Estimated body mass index is 25.92 kg/m  as calculated from the following:    Height as of this encounter: 1.626 m (5' 4\").    Weight as of this encounter: 68.5 kg (151 lb).    Patient presents to the clinic using No DME    Health Maintenance that is potentially due pending provider review:  NONE    n/a    Is there anyone who you would like to be able to receive your results? No  If yes have patient fill out HOWIE    "

## 2019-05-07 NOTE — PROGRESS NOTES
"  SUBJECTIVE:   Roxy Ross is a 48 year old female who presents to clinic today for the following   health issues:      Joint Pain    Onset: 3 years    Description:   Location: bilateral hands, worse on rt side. Worse on thumbs.   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  Limiting range of motions and lifting helps some    Therapies Tried and outcome: Braces and does help. Has been to see Chiropractor and helps.    Family history of rheumatoid arthritis  Wrist braces at night. Helps a lot   Affecting her ability to perform ADL's   Affecting her ability to work.  Was treated for Lymes 20 yrs ago.    -------------------------------------    Additional history: as documented    Reviewed  and updated as needed this visit by clinical staff  Tobacco  Allergies  Meds         Reviewed and updated as needed this visit by Provider         Labs reviewed in EPIC    ROS:   ROS: 10 point ROS neg other than the symptoms noted above in the HPI.      OBJECTIVE:                                                    /84   Pulse 50   Temp 97.9  F (36.6  C) (Tympanic)   Resp 16   Ht 1.626 m (5' 4\")   Wt 68.5 kg (151 lb)   Breastfeeding? No   BMI 25.92 kg/m    Body mass index is 25.92 kg/m .   GENERAL: healthy, alert, well nourished, well hydrated, no distress  HENT: ear canals- normal; TMs- normal; Nose- normal; Mouth- no ulcers, no lesions  NECK: no tenderness, no adenopathy, no asymmetry, no masses, no stiffness; thyroid- normal to palpation  RESP: lungs clear to auscultation - no rales, no rhonchi, no wheezes  CV: regular rates and rhythm, normal S1 S2, no S3 or " S4 and no murmur, no click or rub -  ABDOMEN: soft, no tenderness, no  hepatosplenomegaly, no masses, normal bowel sounds  MS: extremities- no gross deformities noted, no edema  Negative Tinel.  Negative Phalen.  Pulses 2+.  Positive squeeze test hands bilaterally pain with palpation over the PIP joint of the right thumb no obvious warmth or erythema    Diagnostic test results:  Results for orders placed or performed in visit on 05/07/19   CK total   Result Value Ref Range    CK Total 89 30 - 225 U/L   Uric acid   Result Value Ref Range    Uric Acid 4.5 2.6 - 6.0 mg/dL   ESR: Erythrocyte sedimentation rate   Result Value Ref Range    Sed Rate 4 0 - 20 mm/h   **CBC with platelets differential FUTURE 2mo   Result Value Ref Range    WBC 5.9 4.0 - 11.0 10e9/L    RBC Count 4.38 3.8 - 5.2 10e12/L    Hemoglobin 13.2 11.7 - 15.7 g/dL    Hematocrit 39.7 35.0 - 47.0 %    MCV 91 78 - 100 fl    MCH 30.1 26.5 - 33.0 pg    MCHC 33.2 31.5 - 36.5 g/dL    RDW 12.7 10.0 - 15.0 %    Platelet Count 164 150 - 450 10e9/L    % Neutrophils 61.8 %    % Lymphocytes 27.3 %    % Monocytes 8.8 %    % Eosinophils 1.4 %    % Basophils 0.7 %    Absolute Neutrophil 3.7 1.6 - 8.3 10e9/L    Absolute Lymphocytes 1.6 0.8 - 5.3 10e9/L    Absolute Monocytes 0.5 0.0 - 1.3 10e9/L    Absolute Eosinophils 0.1 0.0 - 0.7 10e9/L    Absolute Basophils 0.0 0.0 - 0.2 10e9/L    Diff Method Automated Method      Recent Results (from the past 744 hour(s))   XR Hand Bilateral G/E 3 Views    Narrative    XR HAND BILATERAL G/E 3 VW 5/7/2019 9:59 AM     HISTORY: Bilateral hand pain; Bilateral hand pain      Impression    IMPRESSION: Negative exam.    REID MCDONOUGH MD          ASSESSMENT/PLAN:                                                    1. Bilateral hand pain  Symptomatic care strategies reviewed  Labs to rule out an inflammatory arthropathy today and x-ray today.  We will contact her with results  - Rheumatoid factor  - CK total  - XR Hand Bilateral G/E 3 Views;  Future  - Uric acid  - ESR: Erythrocyte sedimentation rate  - **CBC with platelets differential FUTURE 2mo  - Cyclic Citrullinated Peptide Antibody IgG  - Anti Nuclear Franchesca IgG by IFA with Reflex  - Lyme Disease Franc[place]sca with reflex to WB Serum  Intact about treatment options for pain control.  Patient does not like to take medications and declined medication management today.    2. Bilateral foot pain  Plans to look for an inflammatory arthropathy  - Rheumatoid factor  - CK total  - Uric acid  - ESR: Erythrocyte sedimentation rate  - **CBC with platelets differential FUTURE 2mo  - Cyclic Citrullinated Peptide Antibody IgG  - Anti Nuclear Franchesca IgG by IFA with Reflex  - Lyme Disease Franc[place]sca with reflex to WB Serum    Rest, ice, compress, elevate  Topical  Aspercreme and Biofreeze recommended  Consider topical lidocaine patches    Follow up with Provider -PCP with ongoing symptoms  Call or return to the clinic with any worsening of symptoms or no resolution. Patient/Parent verbalized understanding and is in agreement. Medication side effects reviewed.   Current Outpatient Medications   Medication Sig Dispense Refill     NO ACTIVE MEDICATIONS .       Chart documentation with Dragon Voice recognition Software. Although reviewed after completion, some words and grammatical errors may remain.    See Patient Instructions    RUBÉN Vega [facility]

## 2019-05-08 LAB
ANA SER QL IF: NEGATIVE
B BURGDOR IGG+IGM SER QL: 0.03 (ref 0–0.89)
CCP AB SER IA-ACNC: 1 U/ML
RHEUMATOID FACT SER NEPH-ACNC: <20 IU/ML (ref 0–20)

## 2020-02-10 ENCOUNTER — HEALTH MAINTENANCE LETTER (OUTPATIENT)
Age: 50
End: 2020-02-10

## 2020-03-19 ENCOUNTER — MYC MEDICAL ADVICE (OUTPATIENT)
Dept: FAMILY MEDICINE | Facility: CLINIC | Age: 50
End: 2020-03-19

## 2020-03-20 ENCOUNTER — VIRTUAL VISIT (OUTPATIENT)
Dept: FAMILY MEDICINE | Facility: CLINIC | Age: 50
End: 2020-03-20
Payer: COMMERCIAL

## 2020-03-20 DIAGNOSIS — R07.81 RIB PAIN ON LEFT SIDE: Primary | ICD-10-CM

## 2020-03-20 PROCEDURE — 99441 ZZC PHYSICIAN TELEPHONE EVALUATION 5-10 MIN: CPT | Performed by: NURSE PRACTITIONER

## 2020-03-20 RX ORDER — MELOXICAM 15 MG/1
15 TABLET ORAL DAILY
Qty: 31 TABLET | Refills: 0 | Status: SHIPPED | OUTPATIENT
Start: 2020-03-20 | End: 2022-03-24

## 2020-03-20 NOTE — PROGRESS NOTES
"Roxy Ross is a 49 year old female who is being evaluated via a billable telephone visit.      The patient has been notified of following:     \"This telephone visit will be conducted via a call between you and your physician/provider. We have found that certain health care needs can be provided without the need for a physical exam.  This service lets us provide the care you need with a short phone conversation.  If a prescription is necessary we can send it directly to your pharmacy.  If lab work is needed we can place an order for that and you can then stop by our lab to have the test done at a later time.    If during the course of the call the physician/provider feels a telephone visit is not appropriate, you will not be charged for this service.\"     Roxy Ross complains of    Chief Complaint   Patient presents with     Rib Pain     left        I have reviewed and updated the patient's Past Medical History, Social History, Family History and Medication List.    ALLERGIES  Erythromycin [macrolides]      Additional provider notes:  Left side rib pain for about 3 months. No recent illness. No cough or fever  Painful respiration in the beginning.   Not now. Went on vacation has minimal issues with this while she was gone.   Dental assistant at work. Chiropractor adjustment.   Constant discomfort lateral discomfort.   No lumps or bumps. No swelling in the hands or feet  No chest pain or shortness or breath.   No GI symptoms. Looser stools    has a past medical history of Endometriosis (2003), Fibroid tumors (2003), and Ovarian cyst.        Assessment/Plan:  ASSESSMENT / PLAN:  (R07.81) Rib pain on left side  (primary encounter diagnosis)  Comment: chronic x 3 months  Plan: offered in clinic visit. Declined.   Would benefit from imaging. Will consider when and where she would like to do this. Future order placed for  XR Chest 2         Views, **CBC with platelets differential FUTURE        " 2mo d dimer        Trial meloxicam (MOBIC) 15 MG tablet daily  Take with food         Call or return to the clinic with any worsening of symptoms or no resolution. Patient/Parent verbalized understanding and is in agreement. Medication side effects reviewed.   Current Outpatient Medications   Medication Sig Dispense Refill     meloxicam (MOBIC) 15 MG tablet Take 1 tablet (15 mg) by mouth daily 31 tablet 0     NO ACTIVE MEDICATIONS .         Phone call duration:  6 minutes    Keyana Hernandez MSN,FNP-[facility]  [address] [place] [id number] [phone]

## 2020-03-23 ENCOUNTER — ANCILLARY PROCEDURE (OUTPATIENT)
Dept: GENERAL RADIOLOGY | Facility: CLINIC | Age: 50
End: 2020-03-23
Attending: NURSE PRACTITIONER
Payer: COMMERCIAL

## 2020-03-23 DIAGNOSIS — R07.81 RIB PAIN ON LEFT SIDE: ICD-10-CM

## 2020-03-23 LAB
BASOPHILS # BLD AUTO: 0.1 10E9/L (ref 0–0.2)
BASOPHILS NFR BLD AUTO: 1 %
D DIMER PPP FEU-MCNC: <0.3 UG/ML FEU (ref 0–0.5)
DIFFERENTIAL METHOD BLD: NORMAL
EOSINOPHIL # BLD AUTO: 0.1 10E9/L (ref 0–0.7)
EOSINOPHIL NFR BLD AUTO: 1.4 %
ERYTHROCYTE [DISTWIDTH] IN BLOOD BY AUTOMATED COUNT: 12 % (ref 10–15)
HCT VFR BLD AUTO: 43.5 % (ref 35–47)
HGB BLD-MCNC: 14.1 G/DL (ref 11.7–15.7)
IMM GRANULOCYTES # BLD: 0 10E9/L (ref 0–0.4)
IMM GRANULOCYTES NFR BLD: 0.2 %
LYMPHOCYTES # BLD AUTO: 2 10E9/L (ref 0.8–5.3)
LYMPHOCYTES NFR BLD AUTO: 34.2 %
MCH RBC QN AUTO: 29.5 PG (ref 26.5–33)
MCHC RBC AUTO-ENTMCNC: 32.4 G/DL (ref 31.5–36.5)
MCV RBC AUTO: 91 FL (ref 78–100)
MONOCYTES # BLD AUTO: 0.5 10E9/L (ref 0–1.3)
MONOCYTES NFR BLD AUTO: 9.2 %
NEUTROPHILS # BLD AUTO: 3.2 10E9/L (ref 1.6–8.3)
NEUTROPHILS NFR BLD AUTO: 54 %
NRBC # BLD AUTO: 0 10*3/UL
NRBC BLD AUTO-RTO: 0 /100
PLATELET # BLD AUTO: 192 10E9/L (ref 150–450)
RBC # BLD AUTO: 4.78 10E12/L (ref 3.8–5.2)
WBC # BLD AUTO: 5.8 10E9/L (ref 4–11)

## 2020-03-23 PROCEDURE — 71046 X-RAY EXAM CHEST 2 VIEWS: CPT

## 2020-03-23 PROCEDURE — 36415 COLL VENOUS BLD VENIPUNCTURE: CPT | Performed by: NURSE PRACTITIONER

## 2020-03-23 PROCEDURE — 85379 FIBRIN DEGRADATION QUANT: CPT | Performed by: NURSE PRACTITIONER

## 2020-03-23 PROCEDURE — 85025 COMPLETE CBC W/AUTO DIFF WBC: CPT | Performed by: NURSE PRACTITIONER

## 2020-07-31 ENCOUNTER — OFFICE VISIT (OUTPATIENT)
Dept: FAMILY MEDICINE | Facility: CLINIC | Age: 50
End: 2020-07-31
Payer: COMMERCIAL

## 2020-07-31 VITALS
SYSTOLIC BLOOD PRESSURE: 102 MMHG | OXYGEN SATURATION: 98 % | WEIGHT: 145.8 LBS | RESPIRATION RATE: 16 BRPM | HEART RATE: 59 BPM | HEIGHT: 64 IN | DIASTOLIC BLOOD PRESSURE: 62 MMHG | BODY MASS INDEX: 24.89 KG/M2 | TEMPERATURE: 98.5 F

## 2020-07-31 DIAGNOSIS — Z13.1 SCREENING FOR DIABETES MELLITUS: ICD-10-CM

## 2020-07-31 DIAGNOSIS — Z13.6 CARDIOVASCULAR SCREENING; LDL GOAL LESS THAN 160: ICD-10-CM

## 2020-07-31 DIAGNOSIS — Z00.00 ROUTINE GENERAL MEDICAL EXAMINATION AT A HEALTH CARE FACILITY: Primary | ICD-10-CM

## 2020-07-31 DIAGNOSIS — Z12.31 BREAST CANCER SCREENING BY MAMMOGRAM: ICD-10-CM

## 2020-07-31 DIAGNOSIS — K64.4 EXTERNAL HEMORRHOIDS: ICD-10-CM

## 2020-07-31 LAB
CHOLEST SERPL-MCNC: 209 MG/DL
GLUCOSE SERPL-MCNC: 96 MG/DL (ref 70–99)
HDLC SERPL-MCNC: 89 MG/DL
LDLC SERPL CALC-MCNC: 111 MG/DL
NONHDLC SERPL-MCNC: 120 MG/DL
TRIGL SERPL-MCNC: 43 MG/DL

## 2020-07-31 PROCEDURE — 99396 PREV VISIT EST AGE 40-64: CPT | Performed by: FAMILY MEDICINE

## 2020-07-31 PROCEDURE — 80061 LIPID PANEL: CPT | Performed by: FAMILY MEDICINE

## 2020-07-31 PROCEDURE — 82947 ASSAY GLUCOSE BLOOD QUANT: CPT | Performed by: FAMILY MEDICINE

## 2020-07-31 PROCEDURE — 36415 COLL VENOUS BLD VENIPUNCTURE: CPT | Performed by: FAMILY MEDICINE

## 2020-07-31 ASSESSMENT — ENCOUNTER SYMPTOMS
FREQUENCY: 0
MYALGIAS: 0
DYSURIA: 0
EYE PAIN: 0
ARTHRALGIAS: 0
ABDOMINAL PAIN: 0
FEVER: 0
SHORTNESS OF BREATH: 0
DIARRHEA: 0
COUGH: 0
PARESTHESIAS: 0
BREAST MASS: 0
DIZZINESS: 0
JOINT SWELLING: 0
PALPITATIONS: 0
HEMATOCHEZIA: 0
CONSTIPATION: 0
NAUSEA: 0
HEADACHES: 0
SORE THROAT: 0
HEARTBURN: 0
WEAKNESS: 0
NERVOUS/ANXIOUS: 0
CHILLS: 0
HEMATURIA: 0

## 2020-07-31 ASSESSMENT — MIFFLIN-ST. JEOR: SCORE: 1262.37

## 2020-07-31 NOTE — PROGRESS NOTES
SUBJECTIVE:   CC: Roxy Ross is an 50 year old woman who presents for preventive health visit.     Healthy Habits:     Getting at least 3 servings of Calcium per day:  Yes    Bi-annual eye exam:  Yes    Dental care twice a year:  Yes    Sleep apnea or symptoms of sleep apnea:  None    Diet:  Carbohydrate counting    Duration of exercise:  Less than 15 minutes    Taking medications regularly:  Not Applicable    PHQ-2 Total Score: 0    Additional concerns today:  Yes      Still has the mole on her scalp  Gets caught in brush and comb    Still has hemorrhoids she'd like to get rid of, hard to clean. I had referred her 2 years ago but she never made it    Today's PHQ-2 Score:   PHQ-2 (  [employer]) 2020   Q1: Little interest or pleasure in doing things 0   Q2: Feeling down, depressed or hopeless 0   PHQ-2 Score 0   Q1: Little interest or pleasure in doing things Not at all   Q2: Feeling down, depressed or hopeless Not at all   PHQ-2 Score 0       Abuse: Current or Past(Physical, Sexual or Emotional)- No  Do you feel safe in your environment? Yes    Lab Results   Component Value Date    PAP NIL 2008     Had a hysterectomy for benign reasons    Social History     Tobacco Use     Smoking status: Former Smoker     Packs/day: 0.50     Years: 5.00     Pack years: 2.50     Types: Cigarettes     Last attempt to quit: 1991     Years since quittin.9     Smokeless tobacco: Never Used   Substance Use Topics     Alcohol use: Yes     Comment: beer and wine 4 a week         Alcohol Use 2020   Prescreen: >3 drinks/day or >7 drinks/week? Yes   Prescreen: >3 drinks/day or >7 drinks/week? -   AUDIT SCORE  4       Reviewed orders with patient.  Reviewed health maintenance and updated orders accordingly - Yes  BP Readings from Last 3 Encounters:   20 102/62   19 120/84   18 125/65    Wt Readings from Last 3 Encounters:   20 66.1 kg (145 lb 12.8 oz)   19 68.5 kg (151 lb)    18 72.6 kg (160 lb)                    Mammogram Screening: Patient over age 50, mutual decision to screen reflected in health maintenance.    Pertinent mammograms are reviewed under the imaging tab.  History of abnormal Pap smear: Status post benign hysterectomy. Health Maintenance and Surgical History updated.  PAP / HPV 2008   PAP NIL     Reviewed and updated as needed this visit by clinical staff  Tobacco  Allergies  Meds  Med Hx  Surg Hx  Fam Hx  Soc Hx        Reviewed and updated as needed this visit by Provider        Past Medical History:   Diagnosis Date     Endometriosis     seen in [place] at time of hyst     Fibroid tumors     s/p MERLYN     Ovarian cyst       Past Surgical History:   Procedure Laterality Date     C TOTAL ABDOM HYSTERECTOMY      Hysterectomy, Total Abdominal, fibroid     COLONOSCOPY  2011    Procedure:COLONOSCOPY; Surgeon:HECTOR CONTRERAS; Location:[facility]     COLONOSCOPY N/A 2018    Procedure: COLONOSCOPY;  colonoscopy;  Surgeon: Oliver Cooney MD;  Location: [facility]     HC DILATION/CURETTAGE DIAG/THER NON OB      D & C     HYSTERECTOMY, PAP NO LONGER INDICATED       LAPAROSCOPIC CYSTECTOMY OVARIAN (BENIGN)  2012    Procedure:LAPAROSCOPIC CYSTECTOMY OVARIAN (BENIGN); Final Procedure Done: Laparoscopy, Peritoneal Washing, Lysis of Adhesions; Surgeon:ADRIANA REYNOLDS; Location:[facility]     LAPAROSCOPY DIAGNOSTIC (GYN)  12    Lysis of adhesions, evaluate ovaries     OB History    Para Term  AB Living   3 2 2 0 1 3   SAB TAB Ectopic Multiple Live Births   1 0 0 2 0      # Outcome Date GA Lbr Poncho/2nd Weight Sex Delivery Anes PTL Lv   3 SAB            2 Term            1 Term               Obstetric Comments   Cs twins (invetro fertilization) ,         Review of Systems   Constitutional: Negative for chills and fever.   HENT: Negative for congestion, ear pain, hearing loss and sore throat.    Eyes: Negative  "for pain and visual disturbance.   Respiratory: Negative for cough and shortness of breath.    Cardiovascular: Negative for chest pain, palpitations and peripheral edema.   Gastrointestinal: Negative for abdominal pain, constipation, diarrhea, heartburn, hematochezia and nausea.   Breasts:  Negative for tenderness, breast mass and discharge.   Genitourinary: Negative for dysuria, frequency, genital sores, hematuria, pelvic pain, urgency, vaginal bleeding and vaginal discharge.   Musculoskeletal: Negative for arthralgias, joint swelling and myalgias.   Skin: Negative for rash.   Neurological: Negative for dizziness, weakness, headaches and paresthesias.   Psychiatric/Behavioral: Negative for mood changes. The patient is not nervous/anxious.         OBJECTIVE:   /62   Pulse 59   Temp 98.5  F (36.9  C) (Tympanic)   Resp 16   Ht 1.619 m (5' 3.75\")   Wt 66.1 kg (145 lb 12.8 oz)   SpO2 98%   BMI 25.22 kg/m    Physical Exam  GENERAL: healthy, alert and no distress  EYES: Eyes grossly normal to inspection, PERRL and conjunctivae and sclerae normal  HENT: ear canals and TM's normal, nose and mouth without ulcers or lesions  NECK: no adenopathy, no asymmetry, masses, or scars and thyroid normal to palpation  RESP: lungs clear to auscultation - no rales, rhonchi or wheezes  BREAST: normal without masses, tenderness or nipple discharge and no palpable axillary masses or adenopathy  CV: regular rate and rhythm, normal S1 S2, no S3 or S4, no murmur, click or rub, no peripheral edema and peripheral pulses strong  ABDOMEN: soft, nontender, no hepatosplenomegaly, no masses and bowel sounds normal  RECTAL (female): several hemorrhoid tags  MS: no gross musculoskeletal defects noted, no edema  SKIN: raised greater than 1 cm mole with hair growth on her back parietal scalp  NEURO: Normal strength and tone, mentation intact and speech normal  PSYCH: mentation appears normal, affect normal/bright    Diagnostic Test " "Results:  Labs reviewed in Epic    ASSESSMENT/PLAN:   1. Routine general medical examination at a health care facility  Doing well    2. External hemorrhoids  bothersome  - COLORECTAL SURGERY REFERRAL    3. Screening for diabetes mellitus  Had a large baby (10 lb 14 ox)  - Glucose    4. CARDIOVASCULAR SCREENING; LDL GOAL LESS THAN 160  No recent screen  - Lipid panel reflex to direct LDL Fasting    5. Breast cancer screening by mammogramq  - *MA Screening Digital Bilateral; Future    COUNSELING:  Reviewed preventive health counseling, as reflected in patient instructions    Estimated body mass index is 25.22 kg/m  as calculated from the following:    Height as of this encounter: 1.619 m (5' 3.75\").    Weight as of this encounter: 66.1 kg (145 lb 12.8 oz).         reports that she quit smoking about 28 years ago. Her smoking use included cigarettes. She has a 2.50 pack-year smoking history. She has never used smokeless tobacco.      Counseling Resources:  ATP IV Guidelines  Pooled Cohorts Equation Calculator  Breast Cancer Risk Calculator  FRAX Risk Assessment  ICSI Preventive Guidelines  Dietary Guidelines for Americans, 2010  USDA's MyPlate  ASA Prophylaxis  Lung CA Screening    Salima Junior MD  [facility]  "

## 2020-07-31 NOTE — PATIENT INSTRUCTIONS
See you for the shave biopsy     See the colorectal surgeon    Preventive Health Recommendations  Female Ages 50 - 64    Yearly exam: See your health care provider every year in order to  o Review health changes.   o Discuss preventive care.    o Review your medicines if your doctor has prescribed any.      Get a Pap test every three years (unless you have an abnormal result and your provider advises testing more often).    If you get Pap tests with HPV test, you only need to test every 5 years, unless you have an abnormal result.     You do not need a Pap test if your uterus was removed (hysterectomy) and you have not had cancer.    You should be tested each year for STDs (sexually transmitted diseases) if you're at risk.     Have a mammogram every 1 to 2 years.    Have a colonoscopy at age 50, or have a yearly FIT test (stool test). These exams screen for colon cancer.      Have a cholesterol test every 5 years, or more often if advised.    Have a diabetes test (fasting glucose) every three years. If you are at risk for diabetes, you should have this test more often.     If you are at risk for osteoporosis (brittle bone disease), think about having a bone density scan (DEXA).    Shots: Get a flu shot each year. Get a tetanus shot every 10 years.    Nutrition:     Eat at least 5 servings of fruits and vegetables each day.    Eat whole-grain bread, whole-wheat pasta and brown rice instead of white grains and rice.    Get adequate Calcium and Vitamin D.     Lifestyle    Exercise at least 150 minutes a week (30 minutes a day, 5 days a week). This will help you control your weight and prevent disease.    Limit alcohol to one drink per day.    No smoking.     Wear sunscreen to prevent skin cancer.     See your dentist every six months for an exam and cleaning.    See your eye doctor every 1 to 2 years.

## 2020-08-26 ENCOUNTER — TELEPHONE (OUTPATIENT)
Dept: FAMILY MEDICINE | Facility: CLINIC | Age: 50
End: 2020-08-26

## 2020-09-18 ENCOUNTER — HOSPITAL ENCOUNTER (OUTPATIENT)
Dept: MAMMOGRAPHY | Facility: CLINIC | Age: 50
Discharge: HOME | End: 2020-09-18
Attending: FAMILY MEDICINE | Admitting: FAMILY MEDICINE
Payer: COMMERCIAL

## 2020-09-18 DIAGNOSIS — Z12.31 BREAST CANCER SCREENING BY MAMMOGRAM: ICD-10-CM

## 2020-09-18 PROCEDURE — 77063 BREAST TOMOSYNTHESIS BI: CPT

## 2020-11-16 ENCOUNTER — HEALTH MAINTENANCE LETTER (OUTPATIENT)
Age: 50
End: 2020-11-16

## 2020-12-10 ENCOUNTER — OFFICE VISIT (OUTPATIENT)
Dept: FAMILY MEDICINE | Facility: CLINIC | Age: 50
End: 2020-12-10
Payer: COMMERCIAL

## 2020-12-10 VITALS
BODY MASS INDEX: 26.12 KG/M2 | WEIGHT: 151 LBS | DIASTOLIC BLOOD PRESSURE: 80 MMHG | TEMPERATURE: 98.1 F | HEART RATE: 68 BPM | SYSTOLIC BLOOD PRESSURE: 108 MMHG

## 2020-12-10 DIAGNOSIS — Z23 NEED FOR PROPHYLACTIC VACCINATION AND INOCULATION AGAINST INFLUENZA: ICD-10-CM

## 2020-12-10 DIAGNOSIS — L98.9 SKIN LESION: Primary | ICD-10-CM

## 2020-12-10 PROCEDURE — 88305 TISSUE EXAM BY PATHOLOGIST: CPT | Performed by: PATHOLOGY

## 2020-12-10 PROCEDURE — 11102 TANGNTL BX SKIN SINGLE LES: CPT | Performed by: FAMILY MEDICINE

## 2020-12-10 PROCEDURE — 90682 RIV4 VACC RECOMBINANT DNA IM: CPT | Performed by: FAMILY MEDICINE

## 2020-12-10 PROCEDURE — 90471 IMMUNIZATION ADMIN: CPT | Performed by: FAMILY MEDICINE

## 2020-12-10 NOTE — NURSING NOTE
"Chief Complaint   Patient presents with     Mole     MOLE ON TOP OF HEAD       Initial /80 (BP Location: Right arm)   Pulse 68   Temp 98.1  F (36.7  C) (Tympanic)   Wt 68.5 kg (151 lb)   BMI 26.12 kg/m   Estimated body mass index is 26.12 kg/m  as calculated from the following:    Height as of 7/31/20: 1.619 m (5' 3.75\").    Weight as of this encounter: 68.5 kg (151 lb).    Patient presents to the clinic using No DME    Health Maintenance that is potentially due pending provider review:  NONE      n/a  Is there anyone who you would like to be able to receive your results? No  If yes have patient fill out HOWIE    "

## 2020-12-10 NOTE — PROGRESS NOTES
Subjective     Roxy Ross is a 50 year old female who presents to clinic today for the following health issues:    HPI         MOLE REMOVAL - top of head  Has a mole on scalp that gets caught and irritated and would like it removed        Objective    /80 (BP Location: Right arm)   Pulse 68   Temp 98.1  F (36.7  C) (Tympanic)   Wt 68.5 kg (151 lb)   BMI 26.12 kg/m    Body mass index is 26.12 kg/m .  Physical Exam   General: appears well, in no distress   Skin: approximately 1.5 cm elevated skin colored growth with hair growing out of it on right parietal scalp        Assessment & Plan     Skin lesion    Procedure note: Shave biopsy: Gone over benefits and risks, including bleeding, infection and scarring, patient wishes to proceed. The area(s) were then injected with 1% lidocaine with epi for anesthesia using a 30 gauge needle. Then, employing the usual sterile preparation and technique, a complete shave biopsy was taken of the lesion. Good hemostasis was achieved with electric cautery. Patient tolerated procedure well without any complications. Wound care discussed. Pathology pending.    - Surgical pathology exam    Need for prophylactic vaccination and inoculation against influenza  - INFLUENZA QUAD, RECOMBINANT, P-FREE (RIV4) (FLUBLOCK) [10126]    Patient Instructions   Wound Care Instructions    1.  Keep area dry today.    2.  Starting tomorrow wash gently with soap and water once daily.      3.  Apply Vaseline or antibiotic ointment to the area and cover with a bandage if desired.  Do not let it dry out and form a scab as this will make the resulting scar more noticeable.    4. Protect the area from sun for up to one year afterward as the scar is continuing to remodel.  Sun exposure will also make the resulting scar more noticeable.    5.  Call if the area is very red, tender, has a discharge or is very itchy while healing, or if you have any other questions.  These may be signs of early  infection or allergy.             Return if symptoms worsen or fail to improve.    Salima Casillas MD  [facility]

## 2020-12-14 LAB — COPATH REPORT: NORMAL

## 2021-02-19 ENCOUNTER — IMMUNIZATION (OUTPATIENT)
Dept: NURSING | Facility: CLINIC | Age: 51
End: 2021-02-19
Payer: COMMERCIAL

## 2021-02-19 PROCEDURE — 0001A PR COVID VAC PFIZER DIL RECON 30 MCG/0.3 ML IM: CPT

## 2021-02-19 PROCEDURE — 91300 PR COVID VAC PFIZER DIL RECON 30 MCG/0.3 ML IM: CPT

## 2021-03-12 ENCOUNTER — IMMUNIZATION (OUTPATIENT)
Dept: NURSING | Facility: CLINIC | Age: 51
End: 2021-03-12
Attending: INTERNAL MEDICINE
Payer: COMMERCIAL

## 2021-03-12 PROCEDURE — 91300 PR COVID VAC PFIZER DIL RECON 30 MCG/0.3 ML IM: CPT

## 2021-03-12 PROCEDURE — 0002A PR COVID VAC PFIZER DIL RECON 30 MCG/0.3 ML IM: CPT

## 2021-05-12 ENCOUNTER — OFFICE VISIT (OUTPATIENT)
Dept: FAMILY MEDICINE | Facility: CLINIC | Age: 51
End: 2021-05-12
Payer: COMMERCIAL

## 2021-05-12 ENCOUNTER — ANCILLARY PROCEDURE (OUTPATIENT)
Dept: GENERAL RADIOLOGY | Facility: CLINIC | Age: 51
End: 2021-05-12
Attending: INTERNAL MEDICINE
Payer: COMMERCIAL

## 2021-05-12 VITALS
SYSTOLIC BLOOD PRESSURE: 124 MMHG | TEMPERATURE: 98.3 F | OXYGEN SATURATION: 97 % | HEART RATE: 61 BPM | DIASTOLIC BLOOD PRESSURE: 68 MMHG | RESPIRATION RATE: 16 BRPM

## 2021-05-12 DIAGNOSIS — M25.571 PAIN IN JOINT INVOLVING ANKLE AND FOOT, RIGHT: Primary | ICD-10-CM

## 2021-05-12 DIAGNOSIS — S93.401A SPRAIN OF RIGHT ANKLE, UNSPECIFIED LIGAMENT, INITIAL ENCOUNTER: ICD-10-CM

## 2021-05-12 PROCEDURE — 99213 OFFICE O/P EST LOW 20 MIN: CPT | Performed by: INTERNAL MEDICINE

## 2021-05-12 PROCEDURE — 73610 X-RAY EXAM OF ANKLE: CPT | Mod: RT | Performed by: RADIOLOGY

## 2021-05-12 RX ORDER — OMEGA-3 FATTY ACIDS/FISH OIL 300-1000MG
200 CAPSULE ORAL EVERY 4 HOURS PRN
COMMUNITY
End: 2022-03-24

## 2021-05-12 NOTE — LETTER
May 12, 2021      Roxy Ross  [address] [place] [id number]        To Whom It May Concern:    Roxy Ross was seen in our clinic. She may return to work on 5/17, minimal weight bearing on the right ankle through 5/21/21        Sincerely,        Greta Jean, DO

## 2021-05-12 NOTE — PROGRESS NOTES
"    Assessment & Plan   Problem List Items Addressed This Visit     None      Visit Diagnoses     Pain in joint involving ankle and foot, right    -  Primary    Relevant Medications    ibuprofen (ADVIL/MOTRIN) 200 MG capsule    Other Relevant Orders    XR Ankle Right G/E 3 Views    Sprain of right ankle, unspecified ligament, initial encounter        Relevant Medications    ibuprofen (ADVIL/MOTRIN) 200 MG capsule                    BMI:   Estimated body mass index is 26.12 kg/m  as calculated from the following:    Height as of 7/31/20: 1.619 m (5' 3.75\").    Weight as of 12/10/20: 68.5 kg (151 lb).       Patient Instructions     Patient Education     Understanding Ankle Sprain    The ankle is the joint where the leg and foot meet. Bones are held in place by connective tissue called ligaments. When ankle ligaments are stretched to the point of pain and injury, it's called an ankle sprain. A sprain can tear the ligaments. These tears can be very small but still cause pain. Ankle sprains are graded by the amount of ligament damage.     Grade 1 (mild). There is slight stretching and tiny tears to the ligament fibers. You may have mild ankle swelling and tenderness.    Grade 2 (moderate). This is a partial ligament tear and causes moderate ankle swelling and tenderness. There may be abnormal looseness when the healthcare provider moves your joint.    Grade 3 (severe). There is a complete tear to the ligament and a great deal of ankle swelling and tenderness. The ankle joint may be very unstable.  What causes an ankle sprain?  A sprain may occur when you twist your ankle or bend it too far. This can happen when you stumble or fall. Things that can make an ankle sprain more likely include:     Having had an ankle sprain before    Playing sports that involve running and jumping. Or playing contact sports such as football or hockey.    Wearing shoes that don t support your feet and ankles well    Having ankles with poor " strength and flexibility  Symptoms of an ankle sprain  Symptoms may include:    Pain or soreness in the ankle    Swelling    Redness or bruising    Not being able to walk or put weight on the affected foot    Reduced range of motion in the ankle    A popping or tearing feeling at the time the sprain occurs    An abnormal or dislocated look to the ankle    Instability or too much range of motion in the ankle  Treatment for an ankle sprain  Treatment focuses on reducing pain and swelling, and preventing further injury. Treatments may include:     Resting the ankle. Avoid putting weight on it. This may mean using crutches until the sprain heals.    Prescription or over-the-counter medicines. These help reduce swelling and pain.    Cold packs. These help reduce pain and swelling.    Raising your ankle above your heart. This helps reduce swelling.    Wrapping the ankle with an elastic bandage or ankle brace. This helps reduce swelling and gives some support to the ankle. In rare cases, you may need a cast or boot.    Stretching and other exercises. These improve flexibility and strength.    Heat packs. These may be recommended before doing ankle exercises.  Possible complications of an ankle sprain  An ankle that has been weakened by a sprain can be more likely to have repeated sprains afterward. Doing exercises to strengthen your ankle and improve balance can reduce your risk for repeated sprains. Other possible complications are long-term (chronic) pain or an ankle that remains unstable.   When to call your healthcare provider  Call your healthcare provider right away if you have any of these:    Fever of 100.4 F (38 C) or higher, or as directed by your provider    Chills    Pain, numbness, discoloration, or coldness in the foot or toes    Pain that gets worse    Symptoms that don t get better, or get worse    New symptoms  Jayesh last reviewed this educational content on 6/1/2019 2000-2021 [employer]. All rights reserved. This information is not intended as a substitute for professional medical care. Always follow your healthcare professional's instructions.               No follow-ups on file.    Greta Jean DO  [facility]    Fabby Ramsey is a 50 year old who presents for the following health issues     HPI     Need a Doctors note    Was doing some yard work in the trees (adding some lights) and missed a step on the ladder and twisted the right ankle      Musculoskeletal problem/pain  Onset/Duration: Last Night  Description  Location: ankle - right  Joint Swelling: YES  Redness: bruises (lightly)  Pain: YES- Current (took 600 mg this morning sitting is 4/10), Worst 7/10  Warmth: no  Intensity:  moderate  Progression of Symptoms:  same and constant  Accompanying signs and symptoms:   Fevers: No- Chills last night but no fevers  Numbness/tingling/weakness: YES- tingling in the toes and weak on the foot  History  Trauma to the area: YES  Recent illness:  no  Previous similar problem: no  Previous evaluation:  no  Precipitating or alleviating factors:  Aggravating factors include: none  Therapies tried and outcome: Ice, elevation, wrap  --sh unsure if eversion or inversion injury  --history of ankle sprain 10 years ago  --immediately ice, elevation, compression  --she was unable to bear weight immediately after injury or since  --she has been using crutches.  Here in wheelchair today  --pain is lateral ankle        Review of Systems   Constitutional, HEENT, cardiovascular, pulmonary, gi and gu systems are negative, except as otherwise noted.      Objective    /68   Pulse 61   Temp 98.3  F (36.8  C) (Tympanic)   Resp 16   SpO2 97%   Breastfeeding No   There is no height or weight on file to calculate BMI.  Physical Exam   GENERAL APPEARANCE: healthy, alert and no distress; in wheelchair  ORTHO: Ankle Exam:   Knee:normal appearance, normal on palpation, no  swelling  Lower leg:normal appearance, normal on palpation, normal gastroc-soleus muscle complex    ANKLE  Inspection:Swelling:lateral    Tender:lateral malleolus, proximal fibula, 5th metatarsal base  Non-tender:medial malleolus, achilles tendon, distal 3rd fibula shaft, mid-fibula shaft, distal tibia  Range of Motion:dorsiflexion:  painful, plantarflexion:  painful, inversion:  painful, eversion:  painful  Strength:dorsiflexion:  5/5, plantarflexion:  5/5, inversion: 5/5, eversion:5/5    FOOT  foot exam : Inspection Palpation:   Swelling: lateral swelling  Tender::peroneal tendon:  at proximal 5th metatarsal      No results found for any visits on 05/12/21.  No results found for this or any previous visit (from the past 24 hour(s)).    Xray - my read, no fracture seen.  Formal radiology read pending

## 2021-05-12 NOTE — PATIENT INSTRUCTIONS
Patient Education     Understanding Ankle Sprain    The ankle is the joint where the leg and foot meet. Bones are held in place by connective tissue called ligaments. When ankle ligaments are stretched to the point of pain and injury, it's called an ankle sprain. A sprain can tear the ligaments. These tears can be very small but still cause pain. Ankle sprains are graded by the amount of ligament damage.     Grade 1 (mild). There is slight stretching and tiny tears to the ligament fibers. You may have mild ankle swelling and tenderness.    Grade 2 (moderate). This is a partial ligament tear and causes moderate ankle swelling and tenderness. There may be abnormal looseness when the healthcare provider moves your joint.    Grade 3 (severe). There is a complete tear to the ligament and a great deal of ankle swelling and tenderness. The ankle joint may be very unstable.  What causes an ankle sprain?  A sprain may occur when you twist your ankle or bend it too far. This can happen when you stumble or fall. Things that can make an ankle sprain more likely include:     Having had an ankle sprain before    Playing sports that involve running and jumping. Or playing contact sports such as football or hockey.    Wearing shoes that don t support your feet and ankles well    Having ankles with poor strength and flexibility  Symptoms of an ankle sprain  Symptoms may include:    Pain or soreness in the ankle    Swelling    Redness or bruising    Not being able to walk or put weight on the affected foot    Reduced range of motion in the ankle    A popping or tearing feeling at the time the sprain occurs    An abnormal or dislocated look to the ankle    Instability or too much range of motion in the ankle  Treatment for an ankle sprain  Treatment focuses on reducing pain and swelling, and preventing further injury. Treatments may include:     Resting the ankle. Avoid putting weight on it. This may mean using crutches until the  sprain heals.    Prescription or over-the-counter medicines. These help reduce swelling and pain.    Cold packs. These help reduce pain and swelling.    Raising your ankle above your heart. This helps reduce swelling.    Wrapping the ankle with an elastic bandage or ankle brace. This helps reduce swelling and gives some support to the ankle. In rare cases, you may need a cast or boot.    Stretching and other exercises. These improve flexibility and strength.    Heat packs. These may be recommended before doing ankle exercises.  Possible complications of an ankle sprain  An ankle that has been weakened by a sprain can be more likely to have repeated sprains afterward. Doing exercises to strengthen your ankle and improve balance can reduce your risk for repeated sprains. Other possible complications are long-term (chronic) pain or an ankle that remains unstable.   When to call your healthcare provider  Call your healthcare provider right away if you have any of these:    Fever of 100.4 F (38 C) or higher, or as directed by your provider    Chills    Pain, numbness, discoloration, or coldness in the foot or toes    Pain that gets worse    Symptoms that don t get better, or get worse    New symptoms  Jayesh last reviewed this educational content on 6/1/2019 2000-2021 [employer]. All rights reserved. This information is not intended as a substitute for professional medical care. Always follow your healthcare professional's instructions.

## 2021-05-13 ENCOUNTER — MYC MEDICAL ADVICE (OUTPATIENT)
Dept: FAMILY MEDICINE | Facility: CLINIC | Age: 51
End: 2021-05-13

## 2021-05-13 NOTE — TELEPHONE ENCOUNTER
Provider's note/recommendation relayed to patient with understanding voiced. No further questions/concerns at this time.     Keely Shaikh RN  [facility]

## 2021-05-13 NOTE — RESULT ENCOUNTER NOTE
"There is swelling on the outside of the ankle.  There is a small calcification on the outside of the midfoot which could be a small \"bone chip\".  There is no clear fracture seen.  This may be an avulsion injury -where the ligament partially tears away from the bone.  If pain and swelling are not improving over the next 1 to 2 weeks, we should consider referral to orthopedic and possibly advanced imaging like MRI"

## 2021-05-13 NOTE — TELEPHONE ENCOUNTER
Blood clot this soon after injury would be very unusual.  Swelling that extends into the leg is not atypical of a more significant ankle sprain.  If swelling does not slowly improve over the next 3-5 days, may consider MRI.

## 2021-05-13 NOTE — TELEPHONE ENCOUNTER
"S-(situation): Cold call     B-(background): Seen in clinic yesterday, sends Wayna message with pics today    A-(assessment):   Is not generally in more pain than yesterday, she says where there is any pain is \"through the top of my foot,\" but overall \"it's not more painful.\"  No streaking.  Swelling is now in her toes. More swelling than there was is her main concern, as well as concern for blood clot.   No hx blood clots, no family hx of clots.  No blood disorder, clotting disorders.      R-(recommendations): Reviewed signs of blood clots and to watch for the following: pain, especially deep in the muscle, swelling, aching or tenderness, streaking, red or warm skin, coolness in toes, fever SOB, fast HR.    She had applied a compression bandage but was off most of the night, and is still off now. Do you want her to continue with this compression wrap?    Michell COULTER RN, BSN       "

## 2021-09-18 ENCOUNTER — HEALTH MAINTENANCE LETTER (OUTPATIENT)
Age: 51
End: 2021-09-18

## 2022-01-08 ENCOUNTER — HEALTH MAINTENANCE LETTER (OUTPATIENT)
Age: 52
End: 2022-01-08

## 2022-02-23 ENCOUNTER — MYC MEDICAL ADVICE (OUTPATIENT)
Dept: FAMILY MEDICINE | Facility: CLINIC | Age: 52
End: 2022-02-23

## 2022-02-26 ENCOUNTER — HOSPITAL ENCOUNTER (OUTPATIENT)
Dept: MAMMOGRAPHY | Facility: CLINIC | Age: 52
Discharge: HOME | End: 2022-02-26
Attending: FAMILY MEDICINE | Admitting: FAMILY MEDICINE
Payer: COMMERCIAL

## 2022-02-26 DIAGNOSIS — Z12.31 VISIT FOR SCREENING MAMMOGRAM: ICD-10-CM

## 2022-02-26 PROCEDURE — 77067 SCR MAMMO BI INCL CAD: CPT

## 2022-03-23 ASSESSMENT — ENCOUNTER SYMPTOMS
FEVER: 0
COUGH: 0
ARTHRALGIAS: 1
FREQUENCY: 0
SORE THROAT: 0
CONSTIPATION: 0
SHORTNESS OF BREATH: 0
HEMATOCHEZIA: 0
EYE PAIN: 0
PALPITATIONS: 0
MYALGIAS: 1
WEAKNESS: 0
DIZZINESS: 0
DYSURIA: 0
BREAST MASS: 0
HEADACHES: 0
ABDOMINAL PAIN: 0
NAUSEA: 0
JOINT SWELLING: 0
DIARRHEA: 0
HEARTBURN: 0
NERVOUS/ANXIOUS: 0
HEMATURIA: 0
CHILLS: 0
PARESTHESIAS: 0

## 2022-03-24 ENCOUNTER — OFFICE VISIT (OUTPATIENT)
Dept: FAMILY MEDICINE | Facility: CLINIC | Age: 52
End: 2022-03-24
Payer: COMMERCIAL

## 2022-03-24 VITALS
HEIGHT: 64 IN | TEMPERATURE: 97.7 F | SYSTOLIC BLOOD PRESSURE: 102 MMHG | BODY MASS INDEX: 26.4 KG/M2 | HEART RATE: 61 BPM | DIASTOLIC BLOOD PRESSURE: 68 MMHG | RESPIRATION RATE: 16 BRPM | OXYGEN SATURATION: 100 % | WEIGHT: 154.6 LBS

## 2022-03-24 DIAGNOSIS — M25.521 BILATERAL ELBOW JOINT PAIN: ICD-10-CM

## 2022-03-24 DIAGNOSIS — Z00.00 ROUTINE GENERAL MEDICAL EXAMINATION AT A HEALTH CARE FACILITY: Primary | ICD-10-CM

## 2022-03-24 DIAGNOSIS — Z11.1 SCREENING EXAMINATION FOR PULMONARY TUBERCULOSIS: ICD-10-CM

## 2022-03-24 DIAGNOSIS — Z11.59 NEED FOR HEPATITIS C SCREENING TEST: ICD-10-CM

## 2022-03-24 DIAGNOSIS — Z11.4 SCREENING FOR HIV (HUMAN IMMUNODEFICIENCY VIRUS): ICD-10-CM

## 2022-03-24 DIAGNOSIS — Z13.1 SCREENING FOR DIABETES MELLITUS: ICD-10-CM

## 2022-03-24 DIAGNOSIS — M25.522 BILATERAL ELBOW JOINT PAIN: ICD-10-CM

## 2022-03-24 DIAGNOSIS — Z13.6 CARDIOVASCULAR SCREENING; LDL GOAL LESS THAN 160: ICD-10-CM

## 2022-03-24 LAB
CHOLEST SERPL-MCNC: 273 MG/DL
FASTING STATUS PATIENT QL REPORTED: YES
FASTING STATUS PATIENT QL REPORTED: YES
GLUCOSE BLD-MCNC: 101 MG/DL (ref 70–99)
HDLC SERPL-MCNC: 89 MG/DL
LDLC SERPL CALC-MCNC: 171 MG/DL
NONHDLC SERPL-MCNC: 184 MG/DL
TRIGL SERPL-MCNC: 63 MG/DL

## 2022-03-24 PROCEDURE — 86481 TB AG RESPONSE T-CELL SUSP: CPT | Performed by: FAMILY MEDICINE

## 2022-03-24 PROCEDURE — 99213 OFFICE O/P EST LOW 20 MIN: CPT | Mod: 25 | Performed by: FAMILY MEDICINE

## 2022-03-24 PROCEDURE — 82947 ASSAY GLUCOSE BLOOD QUANT: CPT | Performed by: FAMILY MEDICINE

## 2022-03-24 PROCEDURE — 99396 PREV VISIT EST AGE 40-64: CPT | Performed by: FAMILY MEDICINE

## 2022-03-24 PROCEDURE — 87389 HIV-1 AG W/HIV-1&-2 AB AG IA: CPT | Performed by: FAMILY MEDICINE

## 2022-03-24 PROCEDURE — 36415 COLL VENOUS BLD VENIPUNCTURE: CPT | Performed by: FAMILY MEDICINE

## 2022-03-24 PROCEDURE — 86803 HEPATITIS C AB TEST: CPT | Performed by: FAMILY MEDICINE

## 2022-03-24 PROCEDURE — 80061 LIPID PANEL: CPT | Performed by: FAMILY MEDICINE

## 2022-03-24 ASSESSMENT — ENCOUNTER SYMPTOMS
NAUSEA: 0
WEAKNESS: 0
NERVOUS/ANXIOUS: 0
SORE THROAT: 0
ABDOMINAL PAIN: 0
DIARRHEA: 0
FREQUENCY: 0
SHORTNESS OF BREATH: 0
DIZZINESS: 0
CHILLS: 0
PALPITATIONS: 0
CONSTIPATION: 0
COUGH: 0
DYSURIA: 0
HEMATURIA: 0
MYALGIAS: 1
ARTHRALGIAS: 1
EYE PAIN: 0
HEADACHES: 0
JOINT SWELLING: 0
HEARTBURN: 0
FEVER: 0
HEMATOCHEZIA: 0
BREAST MASS: 0
PARESTHESIAS: 0

## 2022-03-24 ASSESSMENT — PAIN SCALES - GENERAL: PAINLEVEL: NO PAIN (0)

## 2022-03-24 NOTE — PROGRESS NOTES
SUBJECTIVE:   CC: Roxy Ross is an 51 year old woman who presents for preventive health visit.       Patient has been advised of split billing requirements and indicates understanding: Yes  Healthy Habits:     Getting at least 3 servings of Calcium per day:  Yes    Bi-annual eye exam:  Yes    Dental care twice a year:  Yes    Sleep apnea or symptoms of sleep apnea:  None    Diet:  Regular (no restrictions)    Frequency of exercise:  2-3 days/week    Duration of exercise:  Less than 15 minutes    Taking medications regularly:  Not Applicable    Medication side effects:  Not applicable    PHQ-2 Total Score: 0    Additional concerns today:  No      Patient states that she is having some pain in both elbows. Right worse.   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   Low grade pain.   Nothing radiating down arms or into hands, specifically, nothing in to ulnar distribution    Today's PHQ-2 Score:   PHQ-2 ( 1999 Pf[employer]) 3/23/2022   Q1: Little interest or pleasure in doing things 0   Q2: Feeling down, depressed or hopeless 0   PHQ-2 Score 0   PHQ-2 Total Score (12-17 Years)- Positive if 3 or more points; Administer PHQ-A if positive -   Q1: Little interest or pleasure in doing things Not at all   Q2: Feeling down, depressed or hopeless Not at all   PHQ-2 Score 0       Abuse: Current or Past (Physical, Sexual or Emotional) - No  Do you feel safe in your environment? Yes    Have you ever done Advance Care Planning? (For example, a Health Directive, POLST, or a discussion with a medical provider or your loved ones about your wishes): No, advance care planning information given to patient to review.  Advanced care planning was discussed at today's visit.    Social History     Tobacco Use     Smoking status: Former Smoker     Packs/day: 0.50     Years: 5.00     Pack years: 2.50     Types: Cigarettes     Quit date: 9/1/1991     Years since quitting:  30.5     Smokeless tobacco: Never Used   Substance Use Topics     Alcohol use: Yes     Comment: beer and wine 4 a week         Alcohol Use 3/23/2022   Prescreen: >3 drinks/day or >7 drinks/week? No   Prescreen: >3 drinks/day or >7 drinks/week? -   AUDIT SCORE  -       Reviewed orders with patient.  Reviewed health maintenance and updated orders accordingly - Yes  BP Readings from Last 3 Encounters:   03/24/22 102/68   05/12/21 124/68   12/10/20 108/80    Wt Readings from Last 3 Encounters:   03/24/22 70.1 kg (154 lb 9.6 oz)   12/10/20 68.5 kg (151 lb)   07/31/20 66.1 kg (145 lb 12.8 oz)                    Breast Cancer Screening:    FHS-7:   Breast CA Risk Assessment (FHS-7) 2/26/2022 3/23/2022   Did any of your first-degree relatives have breast or ovarian cancer? No No   Did any of your relatives have bilateral breast cancer? No No   Did any man in your family have breast cancer? No No   Did any woman in your family have breast and ovarian cancer? No No   Did any woman in your family have breast cancer before age 50 y? No No   Do you have 2 or more relatives with breast and/or ovarian cancer? No No   Do you have 2 or more relatives with breast and/or bowel cancer? No Yes       Mammogram Screening: Recommended annual mammography  Pertinent mammograms are reviewed under the imaging tab.      Recent Labs   Lab Test 07/31/20  1440 04/12/18  0930 04/07/15  0714   CHOL 209* 190 170   HDL 89 76 62   * 102* 93   TRIG 43 60 76   CHOLHDLRATIO  --   --  2.7        History of abnormal Pap smear: Status post benign hysterectomy. Health Maintenance and Surgical History updated.  PAP / HPV 11/18/2008   PAP (Historical) NIL     Reviewed and updated as needed this visit by clinical staff   Tobacco  Allergies  Meds  Problems  Med Hx  Surg Hx  Fam Hx  Soc   Hx        Reviewed and updated as needed this visit by Provider   Tobacco  Allergies  Meds  Problems  Med Hx  Surg Hx  Fam Hx         Past Medical History:    Diagnosis Date     Endometriosis     seen in [place] at time of hyst     Fibroid tumors     s/p MERLYN     Ovarian cyst       Past Surgical History:   Procedure Laterality Date     COLONOSCOPY  2011    Procedure:COLONOSCOPY; Surgeon:HECTOR CONTRERAS; Location:[facility]     COLONOSCOPY N/A 2018    Procedure: COLONOSCOPY;  colonoscopy;  Surgeon: Oliver Cooney MD;  Location: [facility]     HC DILATION/CURETTAGE DIAG/THER NON OB      D & C     HYSTERECTOMY, PAP NO LONGER INDICATED       LAPAROSCOPIC CYSTECTOMY OVARIAN (BENIGN)  2012    Procedure:LAPAROSCOPIC CYSTECTOMY OVARIAN (BENIGN); Final Procedure Done: Laparoscopy, Peritoneal Washing, Lysis of Adhesions; Surgeon:ADRIANA REYNOLDS; Location:[facility]     LAPAROSCOPY DIAGNOSTIC (GYN)  12    Lysis of adhesions, evaluate ovaries     ZZC TOTAL ABDOM HYSTERECTOMY      Hysterectomy, Total Abdominal, fibroid     OB History    Para Term  AB Living   3 2 2 0 1 3   SAB IAB Ectopic Multiple Live Births   1 0 0 2 0      # Outcome Date GA Lbr Poncho/2nd Weight Sex Delivery Anes PTL Lv   3 SAB            2 Term            1 Term               Obstetric Comments   Cs twins (invetro fertilization) ,         Review of Systems   Constitutional: Negative for chills and fever.   HENT: Negative for congestion, ear pain, hearing loss and sore throat.    Eyes: Negative for pain and visual disturbance.   Respiratory: Negative for cough and shortness of breath.    Cardiovascular: Negative for chest pain, palpitations and peripheral edema.   Gastrointestinal: Negative for abdominal pain, constipation, diarrhea, heartburn, hematochezia and nausea.   Breasts:  Negative for tenderness, breast mass and discharge.   Genitourinary: Negative for dysuria, frequency, genital sores, hematuria, pelvic pain, urgency, vaginal bleeding and vaginal discharge.   Musculoskeletal: Positive for arthralgias and myalgias. Negative for joint swelling.  "  Skin: Negative for rash.   Neurological: Negative for dizziness, weakness, headaches and paresthesias.   Psychiatric/Behavioral: Negative for mood changes. The patient is not nervous/anxious.         OBJECTIVE:   /68 (BP Location: Right arm, Patient Position: Sitting, Cuff Size: Adult Regular)   Pulse 61   Temp 97.7  F (36.5  C) (Tympanic)   Resp 16   Ht 1.619 m (5' 3.75\")   Wt 70.1 kg (154 lb 9.6 oz)   SpO2 100%   BMI 26.75 kg/m    Physical Exam  GENERAL: healthy, alert and no distress  EYES: Eyes grossly normal to inspection, PERRL and conjunctivae and sclerae normal  HENT: ear canals and TM's normal, nose and mouth without ulcers or lesions  NECK: no adenopathy, no asymmetry, masses, or scars and thyroid normal to palpation  RESP: lungs clear to auscultation - no rales, rhonchi or wheezes  BREAST: normal without masses, tenderness or nipple discharge and no palpable axillary masses or adenopathy  CV: regular rate and rhythm, normal S1 S2, no S3 or S4, no murmur, click or rub, no peripheral edema and peripheral pulses strong  ABDOMEN: soft, nontender, no hepatosplenomegaly, no masses and bowel sounds normal  MS: no gross musculoskeletal defects noted, no edema, elbows have no erythema, ecchymosis, warmth or edema, full range of motion, she is tender over the ulnar groove area bilaterally, full strength in arms and forearms  SKIN: no suspicious lesions or rashes  NEURO: Normal strength and tone, mentation intact and speech normal  PSYCH: mentation appears normal, affect normal/bright    Diagnostic Test Results:  Labs reviewed in Epic    ASSESSMENT/PLAN:   Roxy was seen today for physical.    Diagnoses and all orders for this visit:    Routine general medical examination at a health care facility    Bilateral elbow joint pain  Likely some over use syndrome  May benefit from injections  -     Orthopedic  Referral; Future    Screening for HIV (human immunodeficiency virus)  -     HIV " "Antigen Antibody Combo; Future  -     HIV Antigen Antibody Combo    Need for hepatitis C screening test  -     Hepatitis C Screen Reflex to HCV RNA Quant and Genotype; Future  -     Hepatitis C Screen Reflex to HCV RNA Quant and Genotype    Screening examination for pulmonary tuberculosis  -     Quantiferon TB Gold Plus; Future  -     Quantiferon TB Gold Plus    CARDIOVASCULAR SCREENING; LDL GOAL LESS THAN 160  -     Lipid panel reflex to direct LDL Fasting; Future  -     Lipid panel reflex to direct LDL Fasting    Screening for diabetes mellitus  -     Glucose; Future  -     Glucose    Other orders  -     REVIEW OF HEALTH MAINTENANCE PROTOCOL ORDERS        Patient has been advised of split billing requirements and indicates understanding: Yes    COUNSELING:  Reviewed preventive health counseling, as reflected in patient instructions       Regular exercise       Healthy diet/nutrition       Colorectal Cancer Screening       Consider Hep C screening for all patients one time for ages 18-79 years       HIV screeninx in teen years, 1x in adult years, and at intervals if high risk    Estimated body mass index is 26.75 kg/m  as calculated from the following:    Height as of this encounter: 1.619 m (5' 3.75\").    Weight as of this encounter: 70.1 kg (154 lb 9.6 oz).    Weight management plan: Discussed healthy diet and exercise guidelines    She reports that she quit smoking about 30 years ago. Her smoking use included cigarettes. She has a 2.50 pack-year smoking history. She has never used smokeless tobacco.      Counseling Resources:  ATP IV Guidelines  Pooled Cohorts Equation Calculator  Breast Cancer Risk Calculator  BRCA-Related Cancer Risk Assessment: FHS-7 Tool  FRAX Risk Assessment  ICSI Preventive Guidelines  Dietary Guidelines for Americans,   [employer]'s MyPlate  ASA Prophylaxis  Lung CA Screening    Salima Casillas MD  [facility]  "

## 2022-03-24 NOTE — PATIENT INSTRUCTIONS
Patient Education     Prevention Guidelines, Women Ages 50 to 64  Screening tests and vaccines are an important part of managing your health. A screening test is done to find possible disorders or diseases in people who don't have any symptoms. The goal is to find a disease early so lifestyle changes can be made and you can be watched more closely to reduce the risk of disease, or to detect it early enough to treat it most effectively. Screening tests are not considered diagnostic, but are used to determine if more testing is needed. Health counseling is essential, too. Below are guidelines for these, for women ages 50 to 64. Keep in mind that screening advice varies among expert groups. Talk with your healthcare provider about which tests are best for you and to make sure you re up to date on what you need.   Screening  Who needs it  How often    Type 2 diabetes or prediabetes  All women beginning at age 45 and women without symptoms at any age who are overweight or obese and have 1 or more additional risk factors for diabetes.  At  least every 3 years    Type 2 diabetes or prediabetes  All women diagnosed with gestational diabetes  Lifelong testing at least every 3 years    Type 2 diabetes All women with prediabetes  Every year   Unhealthy alcohol use  All women in this age group  At routine exams   Blood pressure All women in this age group  Yearly checkup if your blood pressure is normal   Normal blood pressure is less than 120/80 mm Hg   If your blood pressure reading is higher than normal, follow the advice of your healthcare provider    Breast cancer All women at average risk in this age group  Yearly mammogram should be done until age 54. At age 55, you can switch to every other year or choose to continue yearly.   All women should know how their breasts normally look and feel and know the possible benefits and risks of breast cancer screening with mammograms.    Cervical cancer All women in this age  group, except women who have had a complete hysterectomy  Pap test every 3 years or Pap test with human papillomavirus (HPV) test every 5 years    Chlamydia Women who are sexually active and at increased risk for infection  At yearly routine exams    Colorectal cancer All women at average risk in this age group  Multiple tests are available and are used at different times. Possible tests include:     Flexible sigmoidoscopy every 5 years, or    Colonoscopy every 10 years, or    CT colonography (virtual colonoscopy) every 5 years, or    Yearly fecal occult blood test, or    Yearly fecal immunochemical test every year, or    Stool DNA test, every 3 years  If you choose a test other than a colonoscopy and have an abnormal test result, you will need to follow up with a colonoscopy. Screening advice varies among expert groups. Talk with your healthcare provider about which tests are best for you.   Some people should be screened using a different schedule because of their personal or family health history. Talk with your healthcare provider about your health history.    Depression All women in this age group  At routine exams   Gonorrhea Sexually active women at increased risk for infection  At yearly routine exams    Hepatitis C Anyone at increased risk; 1 time for those born between 1945 and 1965  At routine exams   High cholesterol or triglycerides  All women in this age group who are at risk for coronary artery disease  At least every 5 years; talk with your healthcare provider about your risk    HIV All women At least once during your lifetime; yearly if at high risk    Lung cancer Women between the ages of 55 to 74 who are in fairly good health and are at higher risk for lung cancer         Currently smoke or have  quit within past 15 years         30-pack-year smoking history  , Eligibility criteria and age limit (possibly up to age 80) may vary across major organizations  Yearly lung cancer screening with a  low-dose CT scan (LDCT) Talk with your healthcare provider for more information.    Obesity All women in this age group  At yearly routine exams    Osteoporosis Women who are postmenopausal  Talk with your healthcare provider    Syphilis Women at increased risk for infection  At routine exams; talk with your healthcare provider    Tuberculosis Women at increased risk for infection  Talk with your healthcare provider    Vision All women in this age group  Talk with your healthcare provider    Vaccine Who needs it How often   Chickenpox (varicella)  All women in this age group who have no record of this infection or vaccine  2 doses; the second dose should be given at least 4 weeks after the first dose    Hepatitis A Women at increased risk for infection  2 or 3 doses (depending on the vaccine) given at least 6 months apart; talk with your healthcare provider    Hepatitis B Women at increased risk for infection  2 or 3 doses (depending on the vaccine) ; second dose should be given 1 month after the first dose; if a third dose, it should be given at least 2 months after the second dose and at least 4 months after the first dose; talk with your healthcare provider    Haemophilus influenzae Type B (HIB)  Women at increased risk for infection  1 or 3 doses; talk with your healthcare provider    Influenza (flu) All women in this age group  Once a year   Measles, mumps, rubella (MMR)  Women in this age group born in 1957 or later who have no record of these infections or vaccines  1 or 2doses   Meningococcal Women at increased risk for infection  1 or more doses; talk with your healthcare provider    Pneumococcal conjugate vaccine (PCV13) and pneumococcal polysaccharide vaccine (PPSV23)  Women at increased risk for infection  PCV13: 1 dose ages 19 to 64 (protects against 13 types of pneumococcal bacteria)   PPSV23: 1 or 2 doses through age 64(protects against 23 types of pneumococcal bacteria)   Talk with your healthcare  provider   Tetanus/diphtheria/pertussis (Td/Tdap) booster All women in this age group  Td every 10 years, or a 1-time dose of Tdap instead of a Td booster after age 18, then Td every 10 years    Recombinant zoster vaccine (RZV)  All women ages 50 and older  If 2 doses; the 2nd dose is given 2 to 6 months after the first. This is given even if you've had shingles before or had a previous zoster live vaccine.    Counseling Who needs it How often   BRCA gene mutation testing for breast and ovarian cancer susceptibility  Women with increased risk for having gene mutation  When your risk is known; talk with your healthcare provider    Breast cancer and chemoprevention  Women at high risk for breast cancer  When your risk is known; talk with your healthcare provider    Diet and exercise Women who are overweight or obese  When diagnosed, and then at routine exams    Sexually transmitted infection prevention  Women at increased risk for infection  At routine exams; talk with your healthcare provider    Use of daily aspirin  Women ages 50 and up who are at high risk for cardiovascular health problems and not at increased risk for bleeding as identified by their healthcare provider  When your risk is known; talk with your healthcare provider    Use of tobacco and the health effects it can cause  All women in this age group  Every exam   [employer] last reviewed this educational content on 6/1/2020 2000-2021 [employer]. All rights reserved. This information is not intended as a substitute for professional medical care. Always follow your healthcare professional's instructions.

## 2022-03-25 LAB
GAMMA INTERFERON BACKGROUND BLD IA-ACNC: 0.06 IU/ML
HCV AB SERPL QL IA: NONREACTIVE
HIV 1+2 AB+HIV1 P24 AG SERPL QL IA: NONREACTIVE
M TB IFN-G BLD-IMP: NEGATIVE
M TB IFN-G CD4+ BCKGRND COR BLD-ACNC: 6.77 IU/ML
MITOGEN IGNF BCKGRD COR BLD-ACNC: 0.09 IU/ML
MITOGEN IGNF BCKGRD COR BLD-ACNC: 0.19 IU/ML
QUANTIFERON MITOGEN: 6.83 IU/ML
QUANTIFERON NIL TUBE: 0.06 IU/ML
QUANTIFERON TB1 TUBE: 0.25 IU/ML
QUANTIFERON TB2 TUBE: 0.15

## 2022-04-14 ENCOUNTER — OFFICE VISIT (OUTPATIENT)
Dept: ORTHOPEDICS | Facility: CLINIC | Age: 52
End: 2022-04-14
Payer: COMMERCIAL

## 2022-04-14 VITALS
HEIGHT: 64 IN | BODY MASS INDEX: 26.29 KG/M2 | SYSTOLIC BLOOD PRESSURE: 111 MMHG | WEIGHT: 154 LBS | DIASTOLIC BLOOD PRESSURE: 72 MMHG

## 2022-04-14 DIAGNOSIS — G56.20 CUBITAL TUNNEL SYNDROME, UNSPECIFIED LATERALITY: ICD-10-CM

## 2022-04-14 DIAGNOSIS — M25.522 BILATERAL ELBOW JOINT PAIN: ICD-10-CM

## 2022-04-14 DIAGNOSIS — M25.521 BILATERAL ELBOW JOINT PAIN: ICD-10-CM

## 2022-04-14 DIAGNOSIS — G56.03 BILATERAL CARPAL TUNNEL SYNDROME: Primary | ICD-10-CM

## 2022-04-14 PROCEDURE — 99244 OFF/OP CNSLTJ NEW/EST MOD 40: CPT | Performed by: FAMILY MEDICINE

## 2022-04-14 NOTE — PROGRESS NOTES
Roxy Ross  :  1970  DOS: 2022  MRN: [id number]    Sports Medicine Clinic Visit    PCP: Salima Reinoso    Roxy Ross is a 51 year old Right hand dominant female who is seen in consultation at the request of  Salima Reinoso M.D. presenting with chronic bilateral elbow pain.    Injury: Gradual onset of pain over the past ~ 12 months with lifting.  Pain located over bilateral medial elbow, nonradiating.  Additional Features:  Positive: numbness and weakness.  Symptoms are better with Ibuprofen, Rest and sleeping with pillow.  Symptoms are worse with: gripping/grasping, lifting, bending elbows.  Other evaluation and/or treatments so far consists of: Ice, Ibuprofen, Rest and chiropractic care, elbow bracing.  Recent imaging completed: No recent imaging completed.  Prior History of related problems: none    Social History: currently employed as dental assistant    Review of Systems  Musculoskeletal: as above  Remainder of review of systems is negative including constitutional, CV, pulmonary, GI, Skin and Neurologic except as noted in HPI or medical history.    Past Medical History:   Diagnosis Date     Endometriosis     seen in [place] at time of hyst     Fibroid tumors 2003    s/p MERLYN     Ovarian cyst      Past Surgical History:   Procedure Laterality Date     COLONOSCOPY  2011    Procedure:COLONOSCOPY; Surgeon:HECTOR CONTRERAS; Location:[facility]     COLONOSCOPY N/A 2018    Procedure: COLONOSCOPY;  colonoscopy;  Surgeon: Oliver Cooney MD;  Location: [facility]     HC DILATION/CURETTAGE DIAG/THER NON OB      D & C     HYSTERECTOMY, PAP NO LONGER INDICATED       LAPAROSCOPIC CYSTECTOMY OVARIAN (BENIGN)  2012    Procedure:LAPAROSCOPIC CYSTECTOMY OVARIAN (BENIGN); Final Procedure Done: Laparoscopy, Peritoneal Washing, Lysis of Adhesions; Surgeon:ADRIANA REYNOLDS; [facility]     LAPAROSCOPY DIAGNOSTIC (GYN)  12    Lysis of adhesions, evaluate  "ovaries     ZZC TOTAL ABDOM HYSTERECTOMY  5/03    Hysterectomy, Total Abdominal, fibroid     Family History   Problem Relation Age of Onset     Hypertension Mother      Thyroid Disease Mother      Lipids Father      Cancer - colorectal Father         age 72     Allergies Maternal Grandmother         asthma     Heart Disease Paternal Grandmother      Allergies Brother         asthma     Cancer Paternal Grandfather         stomach         Objective  /72   Ht 1.619 m (5' 3.75\")   Wt 69.9 kg (154 lb)   BMI 26.64 kg/m        General: healthy, alert and in no distress      HEENT: no scleral icterus or conjunctival erythema     Skin: no suspicious lesions or rash. No jaundice.     CV: regular rhythm by palpation, 2+ distal pulses, no pedal edema      Resp: normal respiratory effort without conversational dyspnea     Psych: normal mood and affect      Gait: nonantalgic, appropriate coordination and balance     Neuro: normal light touch sensory exam of the extremities. Motor strength as noted below     Bilateral Wrist and Hand exam    Inspection:       No swelling, bruising or deformity bilateral    Tender:       Volar aspect of radiocarpal joint left greater than right    Non Tender:       Remainder of the Wrist and Hand bilateral,      distal radius bilateral,      anatomic snuffbox bilateral,      scapholunate interval bilateral and      TFCC bilateral    ROM:       Full and symmetric active and passive range of motion of the forearm, wrist and digits bilateral and      painful terminal active and passive flexion extension bilaterally    Strength:       5/5 strength in the muscles of the hand, wrist and forearm bilateral    Special Tests:        positive (+) Tinel's test bilateral,       positive (+) Phalen's test bilateral and       neg (-) Finkelstein's maneuver bilateral    Neurovascular:       2+ radial pulses bilaterally with brisk capillary refill and      normal sensation to light touch in the radial, " median and ulnar nerve distributions    Bilateral Elbow exam:    Inspection:       no ecchymosis       no edema or effusion    ROM:       full with flexion, extension, forearm supination and pronation.       Mild pain in terminal flexion bilaterally    Strength:       flexion 5/5       extension 5/5       forearm supination 5/5       forearm pronation 5/5    Tender:       medial epicondyle very mildly bilaterally moderate tenderness to palpation positive       Positive Tinel's test of the cubital tunnel bilateral, mild associated tenderness to palpation    Non-Tender:       remainder of elbow area       lateral epicondyle       radial head       olecranon       antecubital space    Sensation:       intact throughout hand       intact throughout forearm       Recreating lateral hand symptoms with Tinel's test at the cubital tunnel    Skin:       well perfused       capillary refill less than 2 seconds      Radiology:  XR HAND BILATERAL G/E 3 VW 5/7/2019 9:59 AM      HISTORY: Bilateral hand pain; Bilateral hand pain                                                                      IMPRESSION: Negative exam.    Assessment:  1. Bilateral carpal tunnel syndrome    2. Bilateral elbow joint pain    3. Cubital tunnel syndrome, unspecified laterality        Plan:  Discussed the assessment with the patient.  Follow up: With update via Jenn [facility]ker[facility] versus in clinic in 3 to 4 weeks  Moderately complex elbow pain distal arm issue with seemingly overlapping neurological symptoms of cubital tunnel syndrome at the elbow and carpal tunnel syndrome at the wrist  Based on history and exam I have low suspicion for either thoracic outlet syndrome or cervical radiculopathy although these remain on the differential  We reviewed the option for performing EMG of bilateral upper extremities to help with diagnostic clarity, defer this for now  Prior x-ray imaging of her hands reviewed, no significant findings  Wrist brace strategies reviewed in  detail  Elbow compression for day and nighttime symptoms reviewed in detail, use consistently at night to help block deep elbow flexion  Use of short 1-2 wk course of NSAIDs reviewed, dosing and precautions reviewed if utilized  Oral Tylenol and topical Voltaren gel reviewed as safe OTC options, reviewed safe dosing strategies  Occupational therapy order placed today to help with developing home exercise program with nerve and tendon glides, manual therapies and stretching as indicated  We discussed modified progressive pain-free activity as tolerated  Home handouts provided and supportive care reviewed  All questions were answered today  Contact us with additional questions or concerns  Signs and sx of concern reviewed    Thanks very much for sending this nice lady to us, I will keep you updated with her progress      Sorin Car DO, [facility]  Sports Medicine Physician  [facility]    Time spent in chart review, one-on-one evaluation, discussion with patient regarding: nature of problem, clinical course, prior treatments, therapeutic options, shared-decision making, potential procedures and referrals, and charting related to the visit: 33 minutes.  If applicable, time does not include time spent performing any procedure.                Disclaimer: This note consists of symbols derived from keyboarding, dictation and/or voice recognition software. As a result, there may be errors in the script that have gone undetected. Please consider this when interpreting information found in this chart.

## 2022-04-14 NOTE — LETTER
2022         RE: Roxy Ross  [address] [place] [id number]        Dear Colleague,    Thank you for referring your patient, Roxy Ross, to the [facility] [place]. Please see a copy of my visit note below.    Roxy Ross  :  1970  DOS: 2022  MRN: [id number]    Sports Medicine Clinic Visit    PCP: Salima Reinoso    Roxy Ross is a 51 year old Right hand dominant female who is seen in consultation at the request of  Salima Reinoso M.D. presenting with chronic bilateral elbow pain.    Injury: Gradual onset of pain over the past ~ 12 months with lifting.  Pain located over bilateral medial elbow, nonradiating.  Additional Features:  Positive: numbness and weakness.  Symptoms are better with Ibuprofen, Rest and sleeping with pillow.  Symptoms are worse with: gripping/grasping, lifting, bending elbows.  Other evaluation and/or treatments so far consists of: Ice, Ibuprofen, Rest and chiropractic care, elbow bracing.  Recent imaging completed: No recent imaging completed.  Prior History of related problems: none    Social History: currently employed as dental assistant    Review of Systems  Musculoskeletal: as above  Remainder of review of systems is negative including constitutional, CV, pulmonary, GI, Skin and Neurologic except as noted in HPI or medical history.    Past Medical History:   Diagnosis Date     Endometriosis     seen in [place] at time of hyst     Fibroid tumors     s/p MERLYN     Ovarian cyst      Past Surgical History:   Procedure Laterality Date     COLONOSCOPY  2011    Procedure:COLONOSCOPY; Surgeon:HECTOR CONTRERAS; Location:[facility]     COLONOSCOPY N/A 2018    Procedure: COLONOSCOPY;  colonoscopy;  Surgeon: Oliver Cooney MD;  Location: [facility]     HC DILATION/CURETTAGE DIAG/THER NON OB      D & C     HYSTERECTOMY, PAP NO LONGER INDICATED       LAPAROSCOPIC  "CYSTECTOMY OVARIAN (BENIGN)  1/4/2012    Procedure:LAPAROSCOPIC CYSTECTOMY OVARIAN (BENIGN); Final Procedure Done: Laparoscopy, Peritoneal Washing, Lysis of Adhesions; Surgeon:ADRIANA REYNOLDS; [facility]     LAPAROSCOPY DIAGNOSTIC (GYN)  1/4/12    Lysis of adhesions, evaluate ovaries     ZZC TOTAL ABDOM HYSTERECTOMY  5/03    Hysterectomy, Total Abdominal, fibroid     Family History   Problem Relation Age of Onset     Hypertension Mother      Thyroid Disease Mother      Lipids Father      Cancer - colorectal Father         age 72     Allergies Maternal Grandmother         asthma     Heart Disease Paternal Grandmother      Allergies Brother         asthma     Cancer Paternal Grandfather         stomach         Objective  /72   Ht 1.619 m (5' 3.75\")   Wt 69.9 kg (154 lb)   BMI 26.64 kg/m        General: healthy, alert and in no distress      HEENT: no scleral icterus or conjunctival erythema     Skin: no suspicious lesions or rash. No jaundice.     CV: regular rhythm by palpation, 2+ distal pulses, no pedal edema      Resp: normal respiratory effort without conversational dyspnea     Psych: normal mood and affect      Gait: nonantalgic, appropriate coordination and balance     Neuro: normal light touch sensory exam of the extremities. Motor strength as noted below     Bilateral Wrist and Hand exam    Inspection:       No swelling, bruising or deformity bilateral    Tender:       Volar aspect of radiocarpal joint left greater than right    Non Tender:       Remainder of the Wrist and Hand bilateral,      distal radius bilateral,      anatomic snuffbox bilateral,      scapholunate interval bilateral and      TFCC bilateral    ROM:       Full and symmetric active and passive range of motion of the forearm, wrist and digits bilateral and      painful terminal active and passive flexion extension bilaterally    Strength:       5/5 strength in the muscles of the hand, wrist and forearm " bilateral    Special Tests:        positive (+) Tinel's test bilateral,       positive (+) Phalen's test bilateral and       neg (-) Finkelstein's maneuver bilateral    Neurovascular:       2+ radial pulses bilaterally with brisk capillary refill and      normal sensation to light touch in the radial, median and ulnar nerve distributions    Bilateral Elbow exam:    Inspection:       no ecchymosis       no edema or effusion    ROM:       full with flexion, extension, forearm supination and pronation.       Mild pain in terminal flexion bilaterally    Strength:       flexion 5/5       extension 5/5       forearm supination 5/5       forearm pronation 5/5    Tender:       medial epicondyle very mildly bilaterally moderate tenderness to palpation positive       Positive Tinel's test of the cubital tunnel bilateral, mild associated tenderness to palpation    Non-Tender:       remainder of elbow area       lateral epicondyle       radial head       olecranon       antecubital space    Sensation:       intact throughout hand       intact throughout forearm       Recreating lateral hand symptoms with Tinel's test at the cubital tunnel    Skin:       well perfused       capillary refill less than 2 seconds      Radiology:  XR HAND BILATERAL G/E 3 VW 5/7/2019 9:59 AM      HISTORY: Bilateral hand pain; Bilateral hand pain                                                                      IMPRESSION: Negative exam.    Assessment:  1. Bilateral carpal tunnel syndrome    2. Bilateral elbow joint pain    3. Cubital tunnel syndrome, unspecified laterality        Plan:  Discussed the assessment with the patient.  Follow up: With update via MyChart versus in clinic in 3 to 4 weeks  Moderately complex elbow pain distal arm issue with seemingly overlapping neurological symptoms of cubital tunnel syndrome at the elbow and carpal tunnel syndrome at the wrist  Based on history and exam I have low suspicion for either thoracic outlet  syndrome or cervical radiculopathy although these remain on the differential  We reviewed the option for performing EMG of bilateral upper extremities to help with diagnostic clarity, defer this for now  Prior x-ray imaging of her hands reviewed, no significant findings  Wrist brace strategies reviewed in detail  Elbow compression for day and nighttime symptoms reviewed in detail, use consistently at night to help block deep elbow flexion  Use of short 1-2 wk course of NSAIDs reviewed, dosing and precautions reviewed if utilized  Oral Tylenol and topical Voltaren gel reviewed as safe OTC options, reviewed safe dosing strategies  Occupational therapy order placed today to help with developing home exercise program with nerve and tendon glides, manual therapies and stretching as indicated  We discussed modified progressive pain-free activity as tolerated  Home handouts provided and supportive care reviewed  All questions were answered today  Contact us with additional questions or concerns  Signs and sx of concern reviewed    Thanks very much for sending this nice lady to us, I will keep you updated with her progress      Sorin Car DO, [facility]  Sports Medicine Physician  [facility]    Time spent in chart review, one-on-one evaluation, discussion with patient regarding: nature of problem, clinical course, prior treatments, therapeutic options, shared-decision making, potential procedures and referrals, and charting related to the visit: 33 minutes.  If applicable, time does not include time spent performing any procedure.                Disclaimer: This note consists of symbols derived from keyboarding, dictation and/or voice recognition software. As a result, there may be errors in the script that have gone undetected. Please consider this when interpreting information found in this chart.      Again, thank you for allowing me to participate in the care of your patient.         Sincerely,        Sorin Car, DO

## 2022-04-28 ENCOUNTER — HOSPITAL ENCOUNTER (OUTPATIENT)
Dept: OCCUPATIONAL THERAPY | Facility: CLINIC | Age: 52
Setting detail: THERAPIES SERIES
Discharge: HOME | End: 2022-04-28
Attending: FAMILY MEDICINE
Payer: COMMERCIAL

## 2022-04-28 DIAGNOSIS — G56.03 BILATERAL CARPAL TUNNEL SYNDROME: ICD-10-CM

## 2022-04-28 DIAGNOSIS — M25.522 BILATERAL ELBOW JOINT PAIN: ICD-10-CM

## 2022-04-28 DIAGNOSIS — G56.20 CUBITAL TUNNEL SYNDROME, UNSPECIFIED LATERALITY: ICD-10-CM

## 2022-04-28 DIAGNOSIS — M25.521 BILATERAL ELBOW JOINT PAIN: ICD-10-CM

## 2022-04-28 PROCEDURE — 97535 SELF CARE MNGMENT TRAINING: CPT | Mod: GO | Performed by: OCCUPATIONAL THERAPIST

## 2022-04-28 PROCEDURE — 97110 THERAPEUTIC EXERCISES: CPT | Mod: GO | Performed by: OCCUPATIONAL THERAPIST

## 2022-04-28 PROCEDURE — 97165 OT EVAL LOW COMPLEX 30 MIN: CPT | Mod: GO | Performed by: OCCUPATIONAL THERAPIST

## 2022-04-28 NOTE — PROGRESS NOTES
04/28/22 Hand Therapy Evaluation   General Information/History   Start Of Care Date 04/28/22   Referring Physician Dr. Car   Orders Evaluate And Treat As Indicated   Orders Date 04/14/22   Medical Diagnosis Bilateral Carpal Tunnel Syndrome, Bilateral Cubital Tunnel   Additional Occupational Profile Info/Pertinent history of current problem Patient relates to have started having pain about a year ago. She says she has worn braces on and off for year. She has seen chiropractor over the years which has helped. She tried elbow bracing but that has not helped   Previous treatment or current condition night splinting wrists   Past medical history see chart   How/Where did it occur With repetition/overuse;At home;At work   Date of surgery 04/28/22   Chronicity Recurrent   Hand Dominance Right   Affected side Bilateral  (right worse than left)   Functional limitations perform activities of daily living;perform required work activities;perform desired leisure / sports activities  (sleeping)   Reported Symptoms Pain;Tingling;Locking   Prior level of function Independent ADL;Independent IADL   Important Activities cooking, hiking,camping, fishing, reading   Patient role/Employment history Employed   Occupation dental assistant   Employment Status Working in normal job without restrictions   Occupation Comments 32 hours/ week   Patient/Family goals statement Patient would like to have relief of elbow pain and less numbness in hands to better sleep through night and work  without issues. Wants to avoid symptoms to get worse   Fall Risk Screen   Fall screen completed by OT   Have you fallen 2 or more times in the past year? No   Have you fallen and had an injury in the past year? No   Is patient a fall risk? No   Abuse Screen (yes response referral indicated)   Feels Unsafe at Home or Work/School no   Feels Threatened by Someone no   Does Anyone Try to Keep You From Having Contact with Others or Doing Things Outside Your  Home? no   Physical Signs of Abuse Present no   Pain   Pain Primary Pain Report   Primary Pain Report   Location bilateral Medial elbows   Pain Quality Shooting;Dull   Frequency Intermittent   Scale 2/10;8/10   Pain Is Worse In The A.M.   Pain Is Exacerbated By Lying On Extremity;Gripping;Twisting , Pulling   Pain Is Relieved By Splints   Progression Since Onset Unchanged   Edema   Edema Distal Wrist Crease;Elbow Crease   Elbow Crease (measured in cm)   Elbow Crease -  - Left 26   Elbow Crease -  - Right 26.5   Distal Wrist Crease (measured in cm)   Distal Wrist Crease - - Left 16   Distal Wrist Crease - - Right 17   Tenderness   Tenderness Medial Elbow   Medial Elbow   Left Moderate   Right Moderate   ROM   ROM AROM   AROM   Comments WNL bilaterally   Special Tests   Special Tests Assessed   Left Hand Positive For The Following Special Tests Phalen's;ULTT Ulnar NV Bias;ULTT Median NV Bias   Left Hand Special Tests Comments Positive after 55 seconds,positive elbow flexion test after 55 seconds   Right Hand Positive For The Following Special Tests Phalen's;ULTT Median NV Bias;ULTT Ulnar NV Bias   Right Hand Special Tests Comments pos after 25 seconds, positive elbow flexion test after 25 seconds   Sensation Findings   Sensation Findings Other (see comments)   Sensation Comments currently numb and tingling all digits post Eval- [place] 3.61 bilaterally both hands median and ulnar nerve distribution   Strength   Strength Strength;Other (see comments)    Avg - Left 58,38,44    Avg - Right 45,52,52   Lateral Pinch - Left 12   Lateral Pinch - Right 10   3 Point Pinch - Left 12   3 Point Pinch - Right 9   Strength Comments Increased pain with resistance to flexor pronator mass mostly on right   Education Assessment   Preferred Learning Style Demonstration   Barriers to Learning No barriers   Therapy Interventions   Planned Therapy Interventions Ultrasound;Iontophoresis (list drug);Light Therapy;ROM;Stretching;Manual  Therapy;Splinting;Education of splint wear, care, fit and precautions;Edema Management;Self Care/Home Management;Ergonomic Patient Education;Home Program   Therapy plan comments Will start with home program, other interventions listed if needed   Clinical Impression   Criteria for Skilled Therapeutic Interventions Met yes;treatment indicated   OT Diagnosis decreased ADL's IADL's   Influenced by the following impairments Pain;Edema;Impaired sensation   Assessment of Occupational Performance 1-3 Performance Deficits   Identified Performance Deficits sleeping, work tasks, carrying things   Clinical Decision Making (Complexity) Low complexity   Therapy Frequency up to 2x week- however starting with home program for now   Predicted Duration of Therapy Intervention (days/wks) 6 weeks   Risks and Benefits of Treatment have been explained. Yes   Patient, Family & other staff in agreement with plan of care Yes   Clinical Impression Comments clinical findings consistent with MD diagnosis of bilateral Carpal Tunnel Syndrome and Cubital Tunnel with mild Medial Epicondylitis symptoms on right. Patient is very motivated to do what ever is necessary to prevent further issues. Anticipate with activity modification, splinting and ex program , she will  be able to manage symptoms at this point.   Hand Eval Goals   Hand Eval Goals 1;2;3   Hand Goal 1   Goal Identifier home program   Goal Description Patient to be independent with home program, not needing more than 15% verbal or physical cuing to perform correctly   Target Date 05/19/22   Hand Goal 2   Goal Identifier sleeping   Goal Description Patient to report 80% improvement in sleeping through the night without waking due to symptoms.   Target Date 06/08/22   Total Evaluation Time   ALEXEI Valle/L CHT  Occupational Therapist, Certified Hand Therapist

## 2022-06-22 NOTE — PROGRESS NOTES
04/28/22 Note: This is a copy of patient's last daily visit and will also serve as their Discharge Summary as they have not been in for further treatment 30 days past this date. Final assessment of goals and physical and functional status , therefore unavailable.    Signing Clinician's Name / Credentials   Signing clinician's name / credentials Andria Chand OTR/L CHT   Session Number   Session Number 1   Quick Add   Quick Add  Hand   Hand   Referring Physician Dr. Car   Medical Diagnosis Bilateral Carpal Tunnel Syndrome, Bilateral Cubital Tunnel   Date of surgery 04/28/22   Orders Evaluate And Treat As Indicated   Adult OT Eval Goals   Hand Eval Goals 1;2;3   Hand Goal 1   Goal Identifier home program   Goal Description Patient to be independent with home program, not needing more than 15% verbal or physical cuing to perform correctly   Target Date 05/19/22   Hand Goal 2   Goal Identifier sleeping   Goal Description Patient to report 80% improvement in sleeping through the night without waking due to symptoms.   Target Date 06/08/22   Subjective Report   Subjective Report see Eval   Initial Pain level 8/10  (at worst 2 at best)   Objective Measures   Objective Measures Objective Measure 1   Objective Measure 1   Objective Measure see Eval   Therapeutic Interventions   Therapeutic Interventions Self Care/Home Management;Therapeutic Procedure/Exercise   Self Care/Home Management   Self-Care/Home Mgmt/ADL, Compensatory, Meal Prep Minutes (75062) 15 Minutes   Skilled Intervention education for ADL improvement   Treatment Detail education on Eval findings and activity modification relating to. Different splint style she can order for sleeping, ect   Therapeutic Procedure/Exercise   Therapeutic Procedure: strength, endurance, ROM, flexibility minutes (21525) 15   Treatment Detail Patient Code [id number]-Tendon gliding, Ulnar nerve gliding proximal and distal Median nerve gliding ex to be performed 1-3 x/day 3-10  reps as tolerated and helpful   Education   Learner Patient   Readiness Eager   Method Booklet/handout;Explanation;Video;Demonstration   Response Verbalizes understanding;Demonstrates understanding   Education Notes see Home program   Plan   Homework Patient Code [id number]   Home program activity modification, night splinting, ex as in PTRX- see above   Plan for next session Patient to start on own. Will hold chart open for questions or concerns or to upgrade if indicated.   Total Session Time

## 2022-10-27 ENCOUNTER — OFFICE VISIT (OUTPATIENT)
Dept: FAMILY MEDICINE | Facility: CLINIC | Age: 52
End: 2022-10-27
Payer: COMMERCIAL

## 2022-10-27 VITALS
BODY MASS INDEX: 26.82 KG/M2 | DIASTOLIC BLOOD PRESSURE: 80 MMHG | TEMPERATURE: 97.8 F | HEIGHT: 65 IN | WEIGHT: 161 LBS | OXYGEN SATURATION: 100 % | RESPIRATION RATE: 20 BRPM | HEART RATE: 52 BPM | SYSTOLIC BLOOD PRESSURE: 120 MMHG

## 2022-10-27 DIAGNOSIS — G56.21 CUBITAL TUNNEL SYNDROME ON RIGHT: ICD-10-CM

## 2022-10-27 DIAGNOSIS — G56.03 BILATERAL CARPAL TUNNEL SYNDROME: Primary | ICD-10-CM

## 2022-10-27 DIAGNOSIS — M54.2 NECK PAIN: ICD-10-CM

## 2022-10-27 PROCEDURE — 90472 IMMUNIZATION ADMIN EACH ADD: CPT | Performed by: FAMILY MEDICINE

## 2022-10-27 PROCEDURE — 90715 TDAP VACCINE 7 YRS/> IM: CPT | Performed by: FAMILY MEDICINE

## 2022-10-27 PROCEDURE — 99214 OFFICE O/P EST MOD 30 MIN: CPT | Mod: 25 | Performed by: FAMILY MEDICINE

## 2022-10-27 PROCEDURE — 90682 RIV4 VACC RECOMBINANT DNA IM: CPT | Performed by: FAMILY MEDICINE

## 2022-10-27 PROCEDURE — 90471 IMMUNIZATION ADMIN: CPT | Performed by: FAMILY MEDICINE

## 2022-10-27 ASSESSMENT — ENCOUNTER SYMPTOMS
FREQUENCY: 0
DIZZINESS: 0
COUGH: 0
NAUSEA: 0
WEAKNESS: 1
PARESTHESIAS: 0
NERVOUS/ANXIOUS: 0
CHILLS: 0
EYE PAIN: 0
BREAST MASS: 0
HEMATOCHEZIA: 0
CONSTIPATION: 0
ARTHRALGIAS: 1
JOINT SWELLING: 1
MYALGIAS: 1
DIARRHEA: 0
SORE THROAT: 0
PALPITATIONS: 0
HEMATURIA: 0
FEVER: 0
HEARTBURN: 0
HEADACHES: 1
DYSURIA: 0
SHORTNESS OF BREATH: 0
ABDOMINAL PAIN: 1

## 2022-10-27 NOTE — PROGRESS NOTES
SUBJECTIVE:   CC: Angela is an 52 year old who presents for the following:      Patient has been advised of split billing requirements and indicates understanding: Yes  Healthy Habits:     Getting at least 3 servings of Calcium per day:  NO    Bi-annual eye exam:  Yes    Dental care twice a year:  Yes    Sleep apnea or symptoms of sleep apnea:  None    Diet:  Regular (no restrictions)    Frequency of exercise:  2-3 days/week    Duration of exercise:  Less than 15 minutes    Taking medications regularly:  Yes    Medication side effects:  Not applicable    PHQ-2 Total Score: 0    Additional concerns today:  Yes      Rt arm follow up - saw Ortho and had PHYSICAL THERAPY  She saw Dr Car in April, went to hand therapist for cubital tunnel syndrome and carpal tunnel.   Using her brace.   Has just gotten worse, is affecting everything she does.   Now has tingling in the fingers, right is worse than left     Also has neck pain, goes to chiropractor. Will have pain in right neck and into shoulder.   Can get a headache from it.   Will lock up on her.   Has reduced range of motion, feels pain along neck and into upper right back.   Gets a shooting burning pain from elbow down    Today's PHQ-2 Score:   PHQ-2 ( 1999 Pfizer) 10/26/2022   Q1: Little interest or pleasure in doing things 0   Q2: Feeling down, depressed or hopeless 0   PHQ-2 Score 0   PHQ-2 Total Score (12-17 Years)- Positive if 3 or more points; Administer PHQ-A if positive -   Q1: Little interest or pleasure in doing things Not at all   Q2: Feeling down, depressed or hopeless Not at all   PHQ-2 Score 0       Abuse: Current or Past (Physical, Sexual or Emotional) - No  Do you feel safe in your environment? Yes    Recent Labs   Lab Test 03/24/22  0933 07/31/20  1440 04/12/18  0930 04/07/15  0714   CHOL 273* 209*   < > 170   HDL 89 89   < > 62   * 111*   < > 93   TRIG 63 43   < > 76   CHOLHDLRATIO  --   --   --  2.7    < > = values in this interval not  displayed.        Social History     Tobacco Use     Smoking status: Former     Packs/day: 0.00     Years: 5.00     Pack years: 0.00     Types: Cigarettes     Quit date: 1991     Years since quittin.1     Smokeless tobacco: Never     Tobacco comments:     that was as a teenager   Substance Use Topics     Alcohol use: Yes     Comment: beer and wine 4 a week         Alcohol Use 10/26/2022   Prescreen: >3 drinks/day or >7 drinks/week? No   Prescreen: >3 drinks/day or >7 drinks/week? -   AUDIT SCORE  -       Reviewed orders with patient.  Reviewed health maintenance and updated orders accordingly - Yes  BP Readings from Last 3 Encounters:   10/27/22 120/80   22 111/72   22 102/68    Wt Readings from Last 3 Encounters:   10/27/22 73 kg (161 lb)   22 69.9 kg (154 lb)   22 70.1 kg (154 lb 9.6 oz)               Reviewed and updated as needed this visit by clinical staff   Tobacco  Allergies  Meds   Med Hx  Surg Hx  Fam Hx  Soc Hx        Reviewed and updated as needed this visit by Provider                   Review of Systems   Constitutional: Negative for chills and fever.   HENT: Negative for congestion, ear pain, hearing loss and sore throat.    Eyes: Negative for pain and visual disturbance.   Respiratory: Negative for cough and shortness of breath.    Cardiovascular: Negative for chest pain, palpitations and peripheral edema.   Gastrointestinal: Positive for abdominal pain. Negative for constipation, diarrhea, heartburn, hematochezia and nausea.   Breasts:  Negative for tenderness, breast mass and discharge.   Genitourinary: Negative for dysuria, frequency, genital sores, hematuria, pelvic pain, urgency, vaginal bleeding and vaginal discharge.   Musculoskeletal: Positive for arthralgias, joint swelling and myalgias.   Skin: Negative for rash.   Neurological: Positive for weakness and headaches. Negative for dizziness and paresthesias.   Psychiatric/Behavioral: Positive for mood  "changes. The patient is not nervous/anxious.           OBJECTIVE:   /80 (BP Location: Right arm)   Pulse 52   Temp 97.8  F (36.6  C) (Tympanic)   Resp 20   Ht 1.638 m (5' 4.5\")   Wt 73 kg (161 lb)   SpO2 100%   BMI 27.21 kg/m    Physical Exam  General: appears well, no distress  Neck: supple, no adenopathy  Heart: regular rate and rhythm, normal S1S2, no murmur  Lungs: clear to ascultation   MS: Neck: poor range of motion, especially to right, no pain along the spine, paraspinal muscles are tight, spastic and tender, Spurling's is negative, normal strength and sensation in upper extremeties, DTRs brisk and symmetric, Tinel's positive    ASSESSMENT/PLAN:       Diagnoses and all orders for this visit:    Bilateral carpal tunnel syndrome  -     Orthopedic  Referral; Future    Cubital tunnel syndrome on right  -     Orthopedic  Referral; Future    Neck pain  Discussed physical therapy, she already has done it  Will get EMG, and go from there    Other orders  -     INFLUENZA QUAD, RECOMBINANT, P-FREE (RIV4) (FLUBLOK) AGE 50-64 [CRJ900]  -     TDAP VACCINE (Adacel, Boostrix)      Patient Instructions   The 10-year ASCVD risk score (Marisa DK, et al., 2019) is: 1.1%    Values used to calculate the score:      Age: 52 years      Sex: Female      Is Non- : No      Diabetic: No      Tobacco smoker: No      Systolic Blood Pressure: 120 mmHg      Is BP treated: No      HDL Cholesterol: 89 mg/dL      Total Cholesterol: 273 mg/dL     Next step is an EMG with Dr Hawthorne in [place] orth[facility] dept           She reports that she quit smoking about 31 years ago. Her smoking use included cigarettes. She has never used smokeless tobacco.      Salima Casillas MD  [facility]  "

## 2022-10-27 NOTE — PATIENT INSTRUCTIONS
The 10-year ASCVD risk score (Marisa HWANG, et al., 2019) is: 1.1%    Values used to calculate the score:      Age: 52 years      Sex: Female      Is Non- : No      Diabetic: No      Tobacco smoker: No      Systolic Blood Pressure: 120 mmHg      Is BP treated: No      HDL Cholesterol: 89 mg/dL      Total Cholesterol: 273 mg/dL     Next step is an EMG with Dr Hawthorne in [facility]

## 2022-10-31 ENCOUNTER — MYC MEDICAL ADVICE (OUTPATIENT)
Dept: FAMILY MEDICINE | Facility: CLINIC | Age: 52
End: 2022-10-31

## 2022-10-31 DIAGNOSIS — G56.21 CUBITAL TUNNEL SYNDROME ON RIGHT: ICD-10-CM

## 2022-10-31 DIAGNOSIS — G56.03 BILATERAL CARPAL TUNNEL SYNDROME: Primary | ICD-10-CM

## 2022-10-31 NOTE — TELEPHONE ENCOUNTER
Dr Casillas,   The current ortho referral is to see a provider only and they do not have a Dr Hatwhorne in their system.  If you want an EMG it has to be a separate referral.   Thanks, Jessica COULTER RN

## 2022-10-31 NOTE — TELEPHONE ENCOUNTER
Referral placed with [facility] and faxed to them at [phone] as well as Daisy ([phone])  who schedules EMGs with Dr Christoph COULTER RN

## 2022-10-31 NOTE — TELEPHONE ENCOUNTER
Ok, they used to have Dr Hawthorne doing EMGs with [facility]. Can you call them and see what is up with that? I have sent many patients for the EMG that way, last referral was like a month ago. What has changed?

## 2022-11-15 ENCOUNTER — TRANSFERRED RECORDS (OUTPATIENT)
Dept: HEALTH INFORMATION MANAGEMENT | Facility: CLINIC | Age: 52
End: 2022-11-15

## 2022-11-15 ENCOUNTER — MYC MEDICAL ADVICE (OUTPATIENT)
Dept: FAMILY MEDICINE | Facility: CLINIC | Age: 52
End: 2022-11-15

## 2022-11-15 DIAGNOSIS — G56.03 BILATERAL CARPAL TUNNEL SYNDROME: Primary | ICD-10-CM

## 2022-11-23 ENCOUNTER — MYC MEDICAL ADVICE (OUTPATIENT)
Dept: FAMILY MEDICINE | Facility: CLINIC | Age: 52
End: 2022-11-23

## 2022-11-23 DIAGNOSIS — G56.03 BILATERAL CARPAL TUNNEL SYNDROME: Primary | ICD-10-CM

## 2022-12-01 ENCOUNTER — TRANSFERRED RECORDS (OUTPATIENT)
Dept: HEALTH INFORMATION MANAGEMENT | Facility: CLINIC | Age: 52
End: 2022-12-01

## 2023-01-17 NOTE — TELEPHONE ENCOUNTER
Diagnosis, Referred by & from: External Hemorrhoids   Appt date: 3/8/2023   NOTES STATUS DETAILS   OFFICE NOTE from referring provider Internal [facility]:  1/9/23 - MyChart referral from Dr. Reinoso  10/27/22 - PCC OV with Dr. Reinoso   OFFICE NOTE from other specialist N/A    DISCHARGE SUMMARY from hospital N/A    DISCHARGE REPORT from the ER N/A    OPERATIVE REPORT [facility]:  1/4/12 - OP Note for LAPAROSCOPY, PERITONEAL WASHING, LYSIS OF ADHESIONS with Dr. Hanson  3/31/03 - OP Note for TOTAL ABDOMINAL HYSTERECTOMY with Dr. Quintero   MEDICATION LIST Internal    LABS N/A    DIAGNOSTIC PROCEDURES     COLONOSCOPY Internal [facility]:  9/11/18 - Colonoscopy  4/11/11 - Colonoscopy   IMAGING (DISC & REPORT) N/A

## 2023-02-16 ENCOUNTER — TRANSFERRED RECORDS (OUTPATIENT)
Dept: HEALTH INFORMATION MANAGEMENT | Facility: CLINIC | Age: 53
End: 2023-02-16

## 2023-02-28 ENCOUNTER — TRANSFERRED RECORDS (OUTPATIENT)
Dept: HEALTH INFORMATION MANAGEMENT | Facility: CLINIC | Age: 53
End: 2023-02-28

## 2023-03-08 ENCOUNTER — PRE VISIT (OUTPATIENT)
Dept: SURGERY | Facility: CLINIC | Age: 53
End: 2023-03-08

## 2023-03-16 ENCOUNTER — TRANSFERRED RECORDS (OUTPATIENT)
Dept: HEALTH INFORMATION MANAGEMENT | Facility: CLINIC | Age: 53
End: 2023-03-16
Payer: COMMERCIAL

## 2023-04-20 ENCOUNTER — PATIENT OUTREACH (OUTPATIENT)
Dept: CARE COORDINATION | Facility: CLINIC | Age: 53
End: 2023-04-20
Payer: COMMERCIAL

## 2023-06-01 ENCOUNTER — HEALTH MAINTENANCE LETTER (OUTPATIENT)
Age: 53
End: 2023-06-01

## 2023-06-08 ENCOUNTER — HOSPITAL ENCOUNTER (OUTPATIENT)
Dept: MAMMOGRAPHY | Facility: CLINIC | Age: 53
Discharge: HOME | End: 2023-06-08
Attending: FAMILY MEDICINE | Admitting: FAMILY MEDICINE
Payer: COMMERCIAL

## 2023-06-08 DIAGNOSIS — Z12.31 VISIT FOR SCREENING MAMMOGRAM: ICD-10-CM

## 2023-06-08 PROCEDURE — 77067 SCR MAMMO BI INCL CAD: CPT

## 2023-06-15 ENCOUNTER — OFFICE VISIT (OUTPATIENT)
Dept: FAMILY MEDICINE | Facility: CLINIC | Age: 53
End: 2023-06-15
Payer: COMMERCIAL

## 2023-06-15 VITALS
OXYGEN SATURATION: 99 % | BODY MASS INDEX: 26.63 KG/M2 | SYSTOLIC BLOOD PRESSURE: 124 MMHG | WEIGHT: 156 LBS | RESPIRATION RATE: 16 BRPM | DIASTOLIC BLOOD PRESSURE: 80 MMHG | TEMPERATURE: 98.1 F | HEIGHT: 64 IN | HEART RATE: 60 BPM

## 2023-06-15 DIAGNOSIS — Z12.11 SPECIAL SCREENING FOR MALIGNANT NEOPLASMS, COLON: ICD-10-CM

## 2023-06-15 DIAGNOSIS — Z00.00 ROUTINE GENERAL MEDICAL EXAMINATION AT A HEALTH CARE FACILITY: Primary | ICD-10-CM

## 2023-06-15 DIAGNOSIS — K64.4 EXTERNAL HEMORRHOIDS: ICD-10-CM

## 2023-06-15 DIAGNOSIS — E78.5 HYPERLIPIDEMIA LDL GOAL <130: ICD-10-CM

## 2023-06-15 DIAGNOSIS — R73.9 ELEVATED BLOOD SUGAR: ICD-10-CM

## 2023-06-15 LAB
CHOLEST SERPL-MCNC: 216 MG/DL
HBA1C MFR BLD: 5.2 % (ref 0–5.6)
HDLC SERPL-MCNC: 82 MG/DL
LDLC SERPL CALC-MCNC: 123 MG/DL
NONHDLC SERPL-MCNC: 134 MG/DL
TRIGL SERPL-MCNC: 53 MG/DL

## 2023-06-15 PROCEDURE — 83036 HEMOGLOBIN GLYCOSYLATED A1C: CPT | Performed by: FAMILY MEDICINE

## 2023-06-15 PROCEDURE — 80061 LIPID PANEL: CPT | Performed by: FAMILY MEDICINE

## 2023-06-15 PROCEDURE — 36415 COLL VENOUS BLD VENIPUNCTURE: CPT | Performed by: FAMILY MEDICINE

## 2023-06-15 PROCEDURE — 90750 HZV VACC RECOMBINANT IM: CPT | Performed by: FAMILY MEDICINE

## 2023-06-15 PROCEDURE — 99396 PREV VISIT EST AGE 40-64: CPT | Mod: 25 | Performed by: FAMILY MEDICINE

## 2023-06-15 PROCEDURE — 90471 IMMUNIZATION ADMIN: CPT | Performed by: FAMILY MEDICINE

## 2023-06-15 ASSESSMENT — ENCOUNTER SYMPTOMS
FEVER: 0
MYALGIAS: 0
COUGH: 0
HEMATOCHEZIA: 0
HEADACHES: 0
FREQUENCY: 0
PALPITATIONS: 0
SORE THROAT: 0
NERVOUS/ANXIOUS: 0
CHILLS: 0
HEARTBURN: 0
CONSTIPATION: 0
JOINT SWELLING: 0
DIZZINESS: 0
NAUSEA: 0
HEMATURIA: 0
ABDOMINAL PAIN: 0
DIARRHEA: 0
DYSURIA: 0
SHORTNESS OF BREATH: 0
WEAKNESS: 0
BREAST MASS: 0
EYE PAIN: 0
PARESTHESIAS: 0
ARTHRALGIAS: 0

## 2023-06-15 ASSESSMENT — PAIN SCALES - GENERAL: PAINLEVEL: NO PAIN (0)

## 2023-06-15 NOTE — PATIENT INSTRUCTIONS
Colonoscopy due in Sept          Preventive Health Recommendations  Female Ages 50 - 64    Yearly exam: See your health care provider every year in order to  Review health changes.   Discuss preventive care.    Review your medicines if your doctor has prescribed any.    Get a Pap test every three years (unless you have an abnormal result and your provider advises testing more often).  If you get Pap tests with HPV test, you only need to test every 5 years, unless you have an abnormal result.   You do not need a Pap test if your uterus was removed (hysterectomy) and you have not had cancer.  You should be tested each year for STDs (sexually transmitted diseases) if you're at risk.   Have a mammogram every 1 to 2 years.  Have a colonoscopy at age 50, or have a yearly FIT test (stool test). These exams screen for colon cancer.    Have a cholesterol test every 5 years, or more often if advised.  Have a diabetes test (fasting glucose) every three years. If you are at risk for diabetes, you should have this test more often.   If you are at risk for osteoporosis (brittle bone disease), think about having a bone density scan (DEXA).    Shots: Get a flu shot each year. Get a tetanus shot every 10 years.    Nutrition:   Eat at least 5 servings of fruits and vegetables each day.  Eat whole-grain bread, whole-wheat pasta and brown rice instead of white grains and rice.  Get adequate Calcium and Vitamin D.     Lifestyle  Exercise at least 150 minutes a week (30 minutes a day, 5 days a week). This will help you control your weight and prevent disease.  Limit alcohol to one drink per day.  No smoking.   Wear sunscreen to prevent skin cancer.   See your dentist every six months for an exam and cleaning.  See your eye doctor every 1 to 2 years.

## 2023-06-15 NOTE — PROGRESS NOTES
SUBJECTIVE:   CC: Angela is an 52 year old who presents for preventive health visit.       6/15/2023    12:45 PM   Additional Questions   Roomed by SMA Mere     Healthy Habits:     Getting at least 3 servings of Calcium per day:  Yes    Bi-annual eye exam:  Yes    Dental care twice a year:  Yes    Sleep apnea or symptoms of sleep apnea:  None    Diet:  Regular (no restrictions)    Frequency of exercise:  1 day/week    Duration of exercise:  Less than 15 minutes    Taking medications regularly:  Not Applicable    Medication side effects:  Not applicable    PHQ-2 Total Score: 0    Additional concerns today:  No        Today's PHQ-2 Score:       6/15/2023    12:38 PM   PHQ-2 (  [employer])   Q1: Little interest or pleasure in doing things 0   Q2: Feeling down, depressed or hopeless 0   PHQ-2 Score 0   Q1: Little interest or pleasure in doing things Not at all   Q2: Feeling down, depressed or hopeless Not at all   PHQ-2 Score 0     Has an ongoing evaluation of hemorrhoid skin tags with colorectal      Social History     Tobacco Use     Smoking status: Former     Packs/day: 0.00     Years: 5.00     Pack years: 0.00     Types: Cigarettes     Quit date: 1991     Years since quittin.8     Smokeless tobacco: Never     Tobacco comments:     that was as a teenager   Vaping Use     Vaping status: Never Used     Passive vaping exposure: Yes   Substance Use Topics     Alcohol use: Yes     Comment: beer and wine 4 a week             6/15/2023    12:38 PM   Alcohol Use   Prescreen: >3 drinks/day or >7 drinks/week? No     Reviewed orders with patient.  Reviewed health maintenance and updated orders accordingly - Yes  BP Readings from Last 3 Encounters:   06/15/23 124/80   10/27/22 120/80   22 111/72    Wt Readings from Last 3 Encounters:   06/15/23 70.8 kg (156 lb)   10/27/22 73 kg (161 lb)   22 69.9 kg (154 lb)                    Breast Cancer Screening:    FHS-7:       2022     8:37 AM  3/23/2022     8:37 PM 10/26/2022     7:00 AM 6/8/2023     2:58 PM 6/15/2023    12:39 PM   Breast CA Risk Assessment (FHS-7)   Did any of your first-degree relatives have breast or ovarian cancer? No No No No No   Did any of your relatives have bilateral breast cancer? No No No No No   Did any man in your family have breast cancer? No No No No No   Did any woman in your family have breast and ovarian cancer? No No Unknown No No   Did any woman in your family have breast cancer before age 50 y? No No No No No   Do you have 2 or more relatives with breast and/or ovarian cancer? No No No No No   Do you have 2 or more relatives with breast and/or bowel cancer? No Yes Yes Yes No       Mammogram Screening: Recommended annual mammography  Pertinent mammograms are reviewed under the imaging tab.    History of abnormal Pap smear: Status post benign hysterectomy. Health Maintenance and Surgical History updated.      11/18/2008    12:00 AM   PAP / HPV   PAP (Historical) NIL       Reviewed and updated as needed this visit by clinical staff   Tobacco  Allergies  Meds              Reviewed and updated as needed this visit by Provider                 Past Medical History:   Diagnosis Date     Arthritis      Endometriosis 2003    seen in [place] at time of hyst     Fibroid tumors 2003    s/p MERLYN     Ovarian cyst       Past Surgical History:   Procedure Laterality Date     ABDOMEN SURGERY  hysterectomy 2002     COLONOSCOPY  04/11/2011    Procedure:COLONOSCOPY; Surgeon:HECTOR CONTRERAS; Location:[facility]     COLONOSCOPY N/A 09/11/2018    Procedure: COLONOSCOPY;  colonoscopy;  Surgeon: Oliver Cooney MD;  Location: [facility]     HC DILATION/CURETTAGE DIAG/THER NON OB  1996    D & C     HYSTERECTOMY, PAP NO LONGER INDICATED       LAPAROSCOPIC CYSTECTOMY OVARIAN (BENIGN)  01/04/2012    Procedure:LAPAROSCOPIC CYSTECTOMY OVARIAN (BENIGN); Final Procedure Done: Laparoscopy, Peritoneal Washing, Lysis of Adhesions; Surgeon:GAIL  "ADRIANA CASPER; Location:[facility]     LAPAROSCOPY DIAGNOSTIC (GYN)  2012    Lysis of adhesions, evaluate ovaries     [facility] TOTAL ABDOM HYSTERECTOMY  2003    Hysterectomy, Total Abdominal, fibroid     OB History    Para Term  AB Living   3 2 2 0 1 3   SAB IAB Ectopic Multiple Live Births   1 0 0 2 0      # Outcome Date GA Lbr Poncho/2nd Weight Sex Delivery Anes PTL Lv   3 SAB            2 Term            1 Term               Obstetric Comments   Cs twins (invetro fertilization) ,         Review of Systems   Constitutional: Negative for chills and fever.   HENT: Negative for congestion, ear pain, hearing loss and sore throat.    Eyes: Negative for pain and visual disturbance.   Respiratory: Negative for cough and shortness of breath.    Cardiovascular: Negative for chest pain, palpitations and peripheral edema.   Gastrointestinal: Negative for abdominal pain, constipation, diarrhea, heartburn, hematochezia and nausea.   Breasts:  Negative for tenderness, breast mass and discharge.   Genitourinary: Negative for dysuria, frequency, genital sores, hematuria, pelvic pain, urgency, vaginal bleeding and vaginal discharge.   Musculoskeletal: Negative for arthralgias, joint swelling and myalgias.   Skin: Negative for rash.   Neurological: Negative for dizziness, weakness, headaches and paresthesias.   Psychiatric/Behavioral: Positive for mood changes. The patient is not nervous/anxious.         OBJECTIVE:   /80 (BP Location: Right arm, Patient Position: Sitting, Cuff Size: Adult Large)   Pulse 60   Temp 98.1  F (36.7  C) (Tympanic)   Resp 16   Ht 1.626 m (5' 4\")   Wt 70.8 kg (156 lb)   SpO2 99%   BMI 26.78 kg/m    Physical Exam  GENERAL: healthy, alert and no distress  EYES: Eyes grossly normal to inspection, PERRL and conjunctivae and sclerae normal  HENT: ear canals and TM's normal, nose and mouth without ulcers or lesions  NECK: no adenopathy, no asymmetry, masses, or scars and thyroid " "normal to palpation  RESP: lungs clear to auscultation - no rales, rhonchi or wheezes  CV: regular rate and rhythm, normal S1 S2, no S3 or S4, no murmur, click or rub, no peripheral edema and peripheral pulses strong  ABDOMEN: soft, nontender, no hepatosplenomegaly, no masses and bowel sounds normal  MS: no gross musculoskeletal defects noted, no edema  SKIN: no suspicious lesions or rashes  NEURO: Normal strength and tone, mentation intact and speech normal  PSYCH: mentation appears normal, affect normal/bright    Diagnostic Test Results:  Labs reviewed in Epic    ASSESSMENT/PLAN:   Roxy was seen today for physical.    Diagnoses and all orders for this visit:    Routine general medical examination at a health care facility    External hemorrhoids    Elevated blood sugar  -     Hemoglobin A1c; Future  -     Hemoglobin A1c    Special screening for malignant neoplasms, colon  -     Colonoscopy Screening  Referral; Future    Hyperlipidemia LDL goal <130  -     Lipid panel reflex to direct LDL Fasting; Future  -     Lipid panel reflex to direct LDL Fasting    Other orders  -     REVIEW OF HEALTH MAINTENANCE PROTOCOL ORDERS  -     ZOSTER RECOMBINANT ADJUVANTED (SHINGRIX)        Patient has been advised of split billing requirements and indicates understanding: Yes      COUNSELING:  Reviewed preventive health counseling, as reflected in patient instructions  Special attention given to:        Regular exercise       Healthy diet/nutrition      BMI:   Estimated body mass index is 26.78 kg/m  as calculated from the following:    Height as of this encounter: 1.626 m (5' 4\").    Weight as of this encounter: 70.8 kg (156 lb).   Weight management plan: Discussed healthy diet and exercise guidelines      She reports that she quit smoking about 31 years ago. Her smoking use included cigarettes. She has never used smokeless tobacco.      Salima Casillas MD  [facility]  "

## 2023-09-07 ENCOUNTER — OFFICE VISIT (OUTPATIENT)
Dept: FAMILY MEDICINE | Facility: CLINIC | Age: 53
End: 2023-09-07
Payer: COMMERCIAL

## 2023-09-07 VITALS
RESPIRATION RATE: 14 BRPM | DIASTOLIC BLOOD PRESSURE: 68 MMHG | SYSTOLIC BLOOD PRESSURE: 120 MMHG | TEMPERATURE: 97.1 F | HEIGHT: 64 IN | WEIGHT: 158 LBS | HEART RATE: 58 BPM | OXYGEN SATURATION: 100 % | BODY MASS INDEX: 26.98 KG/M2

## 2023-09-07 DIAGNOSIS — Z12.11 SCREEN FOR COLON CANCER: ICD-10-CM

## 2023-09-07 DIAGNOSIS — K64.4 RESIDUAL HEMORRHOIDAL SKIN TAGS: ICD-10-CM

## 2023-09-07 DIAGNOSIS — Z01.818 PREOP GENERAL PHYSICAL EXAM: Primary | ICD-10-CM

## 2023-09-07 DIAGNOSIS — Z23 NEED FOR SHINGLES VACCINE: ICD-10-CM

## 2023-09-07 PROCEDURE — 99214 OFFICE O/P EST MOD 30 MIN: CPT | Mod: 25 | Performed by: FAMILY MEDICINE

## 2023-09-07 PROCEDURE — 90750 HZV VACC RECOMBINANT IM: CPT | Performed by: FAMILY MEDICINE

## 2023-09-07 PROCEDURE — 90471 IMMUNIZATION ADMIN: CPT | Performed by: FAMILY MEDICINE

## 2023-09-07 ASSESSMENT — PAIN SCALES - GENERAL: PAINLEVEL: NO PAIN (0)

## 2023-09-07 NOTE — PROGRESS NOTES
[facility]  [address] [place] [id number]  Phone: [phone]  Fax: [phone]  Primary Provider: Romeo Reinoso  Pre-op Performing Provider: ROMEO REINOSO      PREOPERATIVE EVALUATION:  Today's date: 9/7/2023    Roxy Ross is a 53 year old female who presents for a preoperative evaluation.      9/7/2023     8:43 AM   Additional Questions   Roomed by Alena       Surgical Information:  Surgery/Procedure: EXAM UNDER ANESTHESIA WITH EXCISION OF SKIN TAG, INTRAOPERATIVE COLONOSCOPY   Surgery Location: [facility]   Surgeon: sari   Surgery Date: 10/05/2023   Time of Surgery: tbd   Where patient plans to recover: At home with family  Fax number for surgical facility: Note does not need to be faxed, will be available electronically in Epic.    Assessment & Plan     The proposed surgical procedure is considered LOW risk.    Preop general physical exam  Medically optimized for surgery    Residual hemorrhoidal skin tags  Surgical removal planned    Screen for colon cancer  planned    Need for shingles vaccine  Second shot given            - No identified additional risk factors other than previously addressed    Antiplatelet or Anticoagulation Medication Instructions:   - Patient is on no antiplatelet or anticoagulation medications.    Additional Medication Instructions:  Patient is on no additional chronic medications    RECOMMENDATION:  APPROVAL GIVEN to proceed with proposed procedure, without further diagnostic evaluation.      Subjective       HPI related to upcoming procedure: has had tag since childbirth 23 years ago.         9/7/2023     8:42 AM   Preop Questions   1. Have you ever had a heart attack or stroke? No   2. Have you ever had surgery on your heart or blood vessels, such as a stent placement, a coronary artery bypass, or surgery on an artery in your head, neck, heart, or legs? No   3. Do you have chest pain with activity? No   4. Do you have a  history of  heart failure? No   5. Do you currently have a cold, bronchitis or symptoms of other infection? No   6. Do you have a cough, shortness of breath, or wheezing? No   7. Do you or anyone in your family have previous history of blood clots? No   8. Do you or does anyone in your family have a serious bleeding problem such as prolonged bleeding following surgeries or cuts? No   9. Have you ever had problems with anemia or been told to take iron pills? No   10. Have you had any abnormal blood loss such as black, tarry or bloody stools, or abnormal vaginal bleeding? No   11. Have you ever had a blood transfusion? No   12. Are you willing to have a blood transfusion if it is medically needed before, during, or after your surgery? Yes   13. Have you or any of your relatives ever had problems with anesthesia? No   14. Do you have sleep apnea, excessive snoring or daytime drowsiness? No   15. Do you have any artifical heart valves or other implanted medical devices like a pacemaker, defibrillator, or continuous glucose monitor? No   16. Do you have artificial joints? No   17. Are you allergic to latex? No   18. Is there any chance that you may be pregnant? No       Health Care Directive:  Patient does not have a Health Care Directive or Living Will: Discussed advance care planning with patient; information given to patient to review.    Preoperative Review of :   reviewed - no record of controlled substances prescribed.        Review of Systems  CONSTITUTIONAL: NEGATIVE for fever, chills, change in weight  ENT/MOUTH: NEGATIVE for ear, mouth and throat problems  RESP: NEGATIVE for significant cough or SOB  CV: NEGATIVE for chest pain, palpitations or peripheral edema  GI: NEGATIVE for nausea, abdominal pain, heartburn, or change in bowel habits  : No dysuria, urinary frequency or urgency.     Patient Active Problem List    Diagnosis Date Noted    CARDIOVASCULAR SCREENING; LDL GOAL LESS THAN 160 03/04/2011      Hyperlipidemia, unspecified hyperlipidemia type    Hypertension, unspecified type    Incontinence in female    Type 2 diabetes mellitus without complication, unspecified whether long term insulin use Priority: Medium    Family history of colon cancer 2011     Priority: Medium    External hemorrhoids 2008     Priority: Medium     Problem list name updated by automated process. Provider to review        Past Medical History:   Diagnosis Date    Arthritis     Endometriosis     seen in [place] at time of hyst    Fibroid tumors     s/p MERLYN    Ovarian cyst      Past Surgical History:   Procedure Laterality Date    ABDOMEN SURGERY  hysterectomy 2002    COLONOSCOPY  2011    Procedure:COLONOSCOPY; Surgeon:HECTOR CONTRERAS; Location:[facility]    COLONOSCOPY N/A 2018    Procedure: COLONOSCOPY;  colonoscopy;  Surgeon: Oliver Cooney MD;  Location: [facility]    HC DILATION/CURETTAGE DIAG/THER NON OB      D & C    HYSTERECTOMY, PAP NO LONGER INDICATED      LAPAROSCOPIC CYSTECTOMY OVARIAN (BENIGN)  2012    Procedure:LAPAROSCOPIC CYSTECTOMY OVARIAN (BENIGN); Final Procedure Done: Laparoscopy, Peritoneal Washing, Lysis of Adhesions; Surgeon:ADRIANA REYNOLDS; Location:[facility]    LAPAROSCOPY DIAGNOSTIC (GYN)  2012    Lysis of adhesions, evaluate ovaries    ZZC TOTAL ABDOM HYSTERECTOMY  2003    Hysterectomy, Total Abdominal, fibroid     Current Outpatient Medications   Medication Sig Dispense Refill    NO ACTIVE MEDICATIONS . (Patient not taking: Reported on 10/27/2022)         Allergies   Allergen Reactions    Erythromycin [Macrolides And Ketolides] Hives     hives        Social History     Tobacco Use    Smoking status: Former     Packs/day: 0.00     Years: 5.00     Pack years: 0.00     Types: Cigarettes     Quit date: 1991     Years since quittin.0    Smokeless tobacco: Never    Tobacco comments:     that was as a teenager   Substance Use Topics    Alcohol use: Yes     Comment: beer and wine 4 a week     Family History   Problem Relation Age of Onset    Hypertension Mother     Thyroid Disease Mother     Lipids Father     Cancer - colorectal Father         age 72     "Diabetes Father     Hypertension Father     Colon Cancer Father     Allergies Maternal Grandmother         asthma    Heart Disease Paternal Grandmother     Allergies Brother         asthma    Other Cancer Brother     Cancer Paternal Grandfather         stomach     History   Drug Use No         Objective     /68   Pulse 58   Temp 97.1  F (36.2  C) (Tympanic)   Resp 14   Ht 1.626 m (5' 4\")   Wt 71.7 kg (158 lb)   LMP  (LMP Unknown)   SpO2 100%   BMI 27.12 kg/m      Physical Exam  GENERAL APPEARANCE: healthy, alert and no distress  HENT: ear canals and TM's normal and nose and mouth without ulcers or lesions  RESP: lungs clear to auscultation - no rales, rhonchi or wheezes  CV: regular rate and rhythm, normal S1 S2, no S3 or S4 and no murmur, click or rub   ABDOMEN: soft, nontender, no HSM or masses and bowel sounds normal  NEURO: Normal strength and tone, sensory exam grossly normal, mentation intact and speech normal    Recent Labs   Lab Test 06/15/23  1344   A1C 5.2        Diagnostics:  No labs were ordered during this visit.   No EKG required for low risk surgery (cataract, skin procedure, breast biopsy, etc).    Revised Cardiac Risk Index (RCRI):  The patient has the following serious cardiovascular risks for perioperative complications:   - No serious cardiac risks = 0 points     RCRI Interpretation: 0 points: Class I (very low risk - 0.4% complication rate)         Signed Electronically by: Salima Casillas MD  Copy of this evaluation report is provided to requesting physician.      "

## 2023-09-07 NOTE — PATIENT INSTRUCTIONS
For informational purposes only. Not to replace the advice of your health care provider. Copyright   2003,  [facility]. All rights reserved. Clinically reviewed by Mary Jo Dhillon MD. [email or web address] [id number] - REV .  Preparing for Your Surgery  Getting started  A nurse will call you to review your health history and instructions. They will give you an arrival time based on your scheduled surgery time. Please be ready to share:  Your doctor's clinic name and phone number  Your medical, surgical, and anesthesia history  A list of allergies and sensitivities  A list of medicines, including herbal treatments and over-the-counter drugs  Whether the patient has a legal guardian (ask how to send us the papers in advance)  Please tell us if you're pregnant--or if there's any chance you might be pregnant. Some surgeries may injure a fetus (unborn baby), so they require a pregnancy test. Surgeries that are safe for a fetus don't always need a test, and you can choose whether to have one.   If you have a child who's having surgery, please ask for a copy of Preparing for Your Child's Surgery.    Preparing for surgery  Within 10 to 30 days of surgery: Have a pre-op exam (sometimes called an H&P, or History and Physical). This can be done at a clinic or pre-operative center.  If you're having a , you may not need this exam. Talk to your care team.  At your pre-op exam, talk to your care team about all medicines you take. If you need to stop any medicines before surgery, ask when to start taking them again.  We do this for your safety. Many medicines can make you bleed too much during surgery. Some change how well surgery (anesthesia) drugs work.  Call your insurance company to let them know you're having surgery. (If you don't have insurance, call [phone].)  Call your clinic if there's any change in your health. This includes signs of a cold or flu (sore throat, runny nose, cough, rash, fever). It also  includes a scrape or scratch near the surgery site.  If you have questions on the day of surgery, call your hospital or surgery center.  Eating and drinking guidelines  For your safety: Unless your surgeon tells you otherwise, follow the guidelines below.  Eat and drink as usual until 8 hours before you arrive for surgery. After that, no food or milk.  Drink clear liquids until 2 hours before you arrive. These are liquids you can see through, like water, Gatorade, and Propel Water. They also include plain black coffee and tea (no cream or milk), candy, and breath mints. You can spit out gum when you arrive.  If you drink alcohol: Stop drinking it the night before surgery.  If your care team tells you to take medicine on the morning of surgery, it's okay to take it with a sip of water.  Preventing infection  Shower or bathe the night before and morning of your surgery. Follow the instructions your clinic gave you. (If no instructions, use regular soap.)  Don't shave or clip hair near your surgery site. We'll remove the hair if needed.  Don't smoke or vape the morning of surgery. You may chew nicotine gum up to 2 hours before surgery. A nicotine patch is okay.  Note: Some surgeries require you to completely quit smoking and nicotine. Check with your surgeon.  Your care team will make every effort to keep you safe from infection. We will:  Clean our hands often with soap and water (or an alcohol-based hand rub).  Clean the skin at your surgery site with a special soap that kills germs.  Give you a special gown to keep you warm. (Cold raises the risk of infection.)  Wear special hair covers, masks, gowns and gloves during surgery.  Give antibiotic medicine, if prescribed. Not all surgeries need antibiotics.  What to bring on the day of surgery  Photo ID and insurance card  Copy of your health care directive, if you have one  Glasses and hearing aids (bring cases)  You can't wear contacts during surgery  Inhaler and eye  drops, if you use them (tell us about these when you arrive)  CPAP machine or breathing device, if you use them  A few personal items, if spending the night  If you have . . .  A pacemaker, ICD (cardiac defibrillator) or other implant: Bring the ID card.  An implanted stimulator: Bring the remote control.  A legal guardian: Bring a copy of the certified (court-stamped) guardianship papers.  Please remove any jewelry, including body piercings. Leave jewelry and other valuables at home.  If you're going home the day of surgery  You must have a responsible adult drive you home. They should stay with you overnight as well.  If you don't have someone to stay with you, and you aren't safe to go home alone, we may keep you overnight. Insurance often won't pay for this.  After surgery  If it's hard to control your pain or you need more pain medicine, please call your surgeon's office.  Questions?   If you have any questions for your care team, list them here: _________________________________________________________________________________________________________________________________________________________________________ ____________________________________ ____________________________________ __

## 2023-10-04 ENCOUNTER — ANESTHESIA EVENT (OUTPATIENT)
Dept: SURGERY | Facility: AMBULATORY SURGERY CENTER | Age: 53
End: 2023-10-04
Payer: COMMERCIAL

## 2023-10-05 ENCOUNTER — HOSPITAL ENCOUNTER (OUTPATIENT)
Facility: AMBULATORY SURGERY CENTER | Age: 53
Discharge: HOME | End: 2023-10-05
Attending: COLON & RECTAL SURGERY
Payer: COMMERCIAL

## 2023-10-05 ENCOUNTER — ANESTHESIA (OUTPATIENT)
Dept: SURGERY | Facility: AMBULATORY SURGERY CENTER | Age: 53
End: 2023-10-05
Payer: COMMERCIAL

## 2023-10-05 VITALS
DIASTOLIC BLOOD PRESSURE: 57 MMHG | SYSTOLIC BLOOD PRESSURE: 114 MMHG | OXYGEN SATURATION: 100 % | BODY MASS INDEX: 26.98 KG/M2 | HEIGHT: 64 IN | WEIGHT: 158 LBS | HEART RATE: 49 BPM | RESPIRATION RATE: 16 BRPM | TEMPERATURE: 97.2 F

## 2023-10-05 DIAGNOSIS — K64.4 EXTERNAL HEMORRHOID: ICD-10-CM

## 2023-10-05 LAB — COLONOSCOPY: NORMAL

## 2023-10-05 RX ORDER — OXYCODONE HYDROCHLORIDE 5 MG/1
5 TABLET ORAL
Status: DISCONTINUED | OUTPATIENT
Start: 2023-10-05 | End: 2023-10-06 | Stop reason: HOSPADM

## 2023-10-05 RX ORDER — OXYCODONE HYDROCHLORIDE 10 MG/1
10 TABLET ORAL
Status: DISCONTINUED | OUTPATIENT
Start: 2023-10-05 | End: 2023-10-06 | Stop reason: HOSPADM

## 2023-10-05 RX ORDER — ACETAMINOPHEN 325 MG/1
975 TABLET ORAL ONCE
Status: COMPLETED | OUTPATIENT
Start: 2023-10-05 | End: 2023-10-05

## 2023-10-05 RX ORDER — GLYCOPYRROLATE 0.2 MG/ML
INJECTION, SOLUTION INTRAMUSCULAR; INTRAVENOUS PRN
Status: DISCONTINUED | OUTPATIENT
Start: 2023-10-05 | End: 2023-10-05

## 2023-10-05 RX ORDER — LIDOCAINE 40 MG/G
CREAM TOPICAL
Status: DISCONTINUED | OUTPATIENT
Start: 2023-10-05 | End: 2023-10-06 | Stop reason: HOSPADM

## 2023-10-05 RX ORDER — BUPIVACAINE HYDROCHLORIDE 2.5 MG/ML
INJECTION, SOLUTION INFILTRATION; PERINEURAL PRN
Status: DISCONTINUED | OUTPATIENT
Start: 2023-10-05 | End: 2023-10-05 | Stop reason: HOSPADM

## 2023-10-05 RX ORDER — OXYCODONE HYDROCHLORIDE 5 MG/1
5 TABLET ORAL EVERY 6 HOURS PRN
Qty: 12 TABLET | Refills: 0 | Status: SHIPPED | OUTPATIENT
Start: 2023-10-05 | End: 2023-10-08

## 2023-10-05 RX ORDER — ONDANSETRON 4 MG/1
4 TABLET, ORALLY DISINTEGRATING ORAL EVERY 30 MIN PRN
Status: DISCONTINUED | OUTPATIENT
Start: 2023-10-05 | End: 2023-10-06 | Stop reason: HOSPADM

## 2023-10-05 RX ORDER — SODIUM CHLORIDE, SODIUM LACTATE, POTASSIUM CHLORIDE, CALCIUM CHLORIDE 600; 310; 30; 20 MG/100ML; MG/100ML; MG/100ML; MG/100ML
INJECTION, SOLUTION INTRAVENOUS CONTINUOUS
Status: DISCONTINUED | OUTPATIENT
Start: 2023-10-05 | End: 2023-10-06 | Stop reason: HOSPADM

## 2023-10-05 RX ORDER — LIDOCAINE HYDROCHLORIDE 20 MG/ML
INJECTION, SOLUTION INFILTRATION; PERINEURAL PRN
Status: DISCONTINUED | OUTPATIENT
Start: 2023-10-05 | End: 2023-10-05

## 2023-10-05 RX ORDER — BUPIVACAINE HYDROCHLORIDE 5 MG/ML
INJECTION, SOLUTION PERINEURAL PRN
Status: DISCONTINUED | OUTPATIENT
Start: 2023-10-05 | End: 2023-10-05 | Stop reason: HOSPADM

## 2023-10-05 RX ORDER — ONDANSETRON 4 MG/1
4 TABLET, ORALLY DISINTEGRATING ORAL
Status: CANCELLED | OUTPATIENT
Start: 2023-10-05

## 2023-10-05 RX ORDER — FENTANYL CITRATE 50 UG/ML
INJECTION, SOLUTION INTRAMUSCULAR; INTRAVENOUS PRN
Status: DISCONTINUED | OUTPATIENT
Start: 2023-10-05 | End: 2023-10-05

## 2023-10-05 RX ORDER — PROPOFOL 10 MG/ML
INJECTION, EMULSION INTRAVENOUS CONTINUOUS PRN
Status: DISCONTINUED | OUTPATIENT
Start: 2023-10-05 | End: 2023-10-05

## 2023-10-05 RX ORDER — DEXAMETHASONE SODIUM PHOSPHATE 4 MG/ML
INJECTION, SOLUTION INTRA-ARTICULAR; INTRALESIONAL; INTRAMUSCULAR; INTRAVENOUS; SOFT TISSUE PRN
Status: DISCONTINUED | OUTPATIENT
Start: 2023-10-05 | End: 2023-10-05

## 2023-10-05 RX ORDER — ONDANSETRON 2 MG/ML
4 INJECTION INTRAMUSCULAR; INTRAVENOUS EVERY 30 MIN PRN
Status: DISCONTINUED | OUTPATIENT
Start: 2023-10-05 | End: 2023-10-06 | Stop reason: HOSPADM

## 2023-10-05 RX ORDER — ONDANSETRON 2 MG/ML
INJECTION INTRAMUSCULAR; INTRAVENOUS PRN
Status: DISCONTINUED | OUTPATIENT
Start: 2023-10-05 | End: 2023-10-05

## 2023-10-05 RX ADMIN — ONDANSETRON 4 MG: 2 INJECTION INTRAMUSCULAR; INTRAVENOUS at 08:11

## 2023-10-05 RX ADMIN — ACETAMINOPHEN 975 MG: 325 TABLET ORAL at 07:47

## 2023-10-05 RX ADMIN — GLYCOPYRROLATE 0.2 MG: 0.2 INJECTION, SOLUTION INTRAMUSCULAR; INTRAVENOUS at 08:11

## 2023-10-05 RX ADMIN — LIDOCAINE HYDROCHLORIDE 3 ML: 20 INJECTION, SOLUTION INFILTRATION; PERINEURAL at 08:09

## 2023-10-05 RX ADMIN — SODIUM CHLORIDE, SODIUM LACTATE, POTASSIUM CHLORIDE, CALCIUM CHLORIDE: 600; 310; 30; 20 INJECTION, SOLUTION INTRAVENOUS at 07:53

## 2023-10-05 RX ADMIN — FENTANYL CITRATE 25 MCG: 50 INJECTION, SOLUTION INTRAMUSCULAR; INTRAVENOUS at 09:02

## 2023-10-05 RX ADMIN — FENTANYL CITRATE 25 MCG: 50 INJECTION, SOLUTION INTRAMUSCULAR; INTRAVENOUS at 08:46

## 2023-10-05 RX ADMIN — DEXAMETHASONE SODIUM PHOSPHATE 8 MG: 4 INJECTION, SOLUTION INTRA-ARTICULAR; INTRALESIONAL; INTRAMUSCULAR; INTRAVENOUS; SOFT TISSUE at 08:15

## 2023-10-05 RX ADMIN — PROPOFOL 160 MCG/KG/MIN: 10 INJECTION, EMULSION INTRAVENOUS at 08:09

## 2023-10-05 ASSESSMENT — ENCOUNTER SYMPTOMS: SEIZURES: 0

## 2023-10-05 NOTE — PROCEDURES
COLON AND RECTAL SURGERY OPERATIVE NOTE    DATE OF SERVICE: 10/5/23     PROCEDURE NAME:   Rectal exam under anesthesia  Excision of external hemorrhoidal skin tags x 2  Intra operative colonoscopy     PRE-PROCEDURE DIAGNOSIS: External hemorrhoidal skin tags, family history of rectal cancer     POST-PROCEDURE DIAGNOSIS: same     SURGEON: Natalia Ramos MD     ASSISTANT: none     FINDINGS: 2 cecal polyps, one was 1.5cm in size, left posterior and right posterior skin tags     ESTIMATED BLOOD LOSS: 5 mlmL     ANESTHESIA: mac with local     SPECIMEN: cecal polyp x 2, left posterior anal skin tag, right posterior anal skin tag    INDICATIONS FOR PROCEDURE:  Roxy Ross is a 53 year old female presenting with symptomatic hemorrhoidal skin tags. She also has a family hx of rectal cancer in her father and was due for a colonoscopy recall.  The risks and benefits of surgery were discussed with the patient including bleeding, infection, damage to the sphincter complex with incontinence, imperfect cosmetic results and residual skin tags. The patient verbalized understanding and consented to proceed.        DESCRIPTION OF PROCEDURE:  The patient was taken to the operating room and placed under mac anesthesia. They were then placed initially in the left lateral decubitus position. We then started with the colonoscopy, please see its separate report for further details. We then repositioned the patient in the prone branden knife position on the operating room table. The buttocks were taped apart. The surgical area was then prepped and draped in the usual sterile fashion. A timeout was performed per protocol. We then started with a perianal block with 0.25% marcaine.  We examined the perianal skin externally and noted a large posterior broad based hemorrhoidal skin tag that seemed to have two leaflets, one on the left which was larger and one on the right. A digital rectal exam was performed which revealed no masses  or stenosis.  The bivalve anoscope was inserted and the anal canal and distal rectum appeared normal. There were a couple small papilla. We then proceeded to excise these two hemorrhoidal tags, taking care to elevate it off the underlying sphincter. A small skin bridge was preserved between the two leaflets. The larger left sided defect was closed with a running 3-0 vicryl suture.     Hemostasis was achieved. We then removed the anoscope and placed some fibrillar in the anal canal and fluffs for dressing. All sponge, needle and instrument counts were correct at the end of the procedure. The patient tolerated the procedure well and was awakened from anesthesia without difficulty, and transferred to the recovery room in stable condition.    Natalia Ramos MD  [facility]  [phone]

## 2023-10-05 NOTE — ANESTHESIA CARE TRANSFER NOTE
Patient: Roxy Ross    Procedure: Procedure(s):  EXAM UNDER ANESTHESIA WITH EXCISION OF SKIN TAG  INTRAOPERATIVE COLONOSCOPY,  POLYPECTOMY       Diagnosis: External hemorrhoid [K64.4]  Diagnosis Additional Information: No value filed.    Anesthesia Type:   MAC     Note:    Oropharynx: oropharynx clear of all foreign objects  Level of Consciousness: drowsy  Oxygen Supplementation: face mask  Level of Supplemental Oxygen (L/min / FiO2): 6  Independent Airway: airway patency satisfactory and stable  Dentition: dentition unchanged  Vital Signs Stable: post-procedure vital signs reviewed and stable  Report to RN Given: handoff report given  Patient transferred to: Phase II    Handoff Report: Identifed the Patient, Identified the Reponsible Provider, Reviewed the pertinent medical history, Discussed the surgical course, Reviewed Intra-OP anesthesia mangement and issues during anesthesia, Set expectations for post-procedure period and Allowed opportunity for questions and acknowledgement of understanding      Vitals:  Vitals Value Taken Time   BP 91/53 10/05/23 0919   Temp 36.2  C (97.2  F) 10/05/23 0919   Pulse 55 10/05/23 0918   Resp 16 10/05/23 0919   SpO2 98 % 10/05/23 0919   Vitals shown include unvalidated device data.    Electronically Signed By: RUBÉN Roman CRNA  October 5, 2023  9:21 AM

## 2023-10-05 NOTE — DISCHARGE INSTRUCTIONS
If you have any questions or concerns regarding your procedure, please contact Dr. Ramos, her office number is [phone].    You received 975 mg of acetaminophen (Tylenol) at 7:47 am. Please do not take an additional dose of Tylenol until after 1:47 PM.    Do not exceed 4,000 mg of acetaminophen in a 24 hour period, keep in mind that acetaminophen can also be found in many over-the-counter cold medications as well as narcotics that may be given for pain.  [facility]   Adult Discharge Orders & Instructions     For 24 hours after surgery    Get plenty of rest.  A responsible adult must stay with you for at least 24 hours after you leave the hospital.   Do not drive or use heavy equipment.  If you have weakness or tingling, don't drive or use heavy equipment until this feeling goes away.  Do not drink alcohol.  Avoid strenuous or risky activities.  Ask for help when climbing stairs.   You may feel lightheaded.  IF so, sit for a few minutes before standing.  Have someone help you get up.   If you have nausea (feel sick to your stomach): Drink only clear liquids such as apple juice, ginger ale, broth or 7-Up.  Rest may also help.  Be sure to drink enough fluids.  Move to a regular diet as you feel able.  You may have a slight fever. Call the doctor if your fever is over 100 F (37.7 C) (taken under the tongue) or lasts longer than 24 hours.  You may have a dry mouth, a sore throat, muscle aches or trouble sleeping.  These should go away after 24 hours.  Do not make important or legal decisions.   Call your doctor for any of the followin.  Signs of infection (fever, growing tenderness at the surgery site, a large amount of drainage or bleeding, severe pain, foul-smelling drainage, redness, swelling).    2. It has been over 8 to 10 hours since surgery and you are still not able to urinate (pass water).    3.  Headache for over 24 hours.      You have just undergone anorectal surgery.  Here are a few  things to expect after your surgery:  1) Everybody has pain - this is expected.  I recommend staying on a baseline of tylenol and ibuprofen (advil) if you are able to take NSAIDs. Use the tylenol and ibuprofen as directed. Generally you can take the tylenol 650mg every 6 hours. You can take ibuprofen 600-800mg every 6-8 hours. Avoid ibuprofen or NSAIDs if you have Crohn's disease, Ulcerative colitis or kidney problems. We will also send you with a prescription for a stronger pain medication like oxycodone. Take this as well in addition to the others if you need it. This can make you have more constipation, and you should not drive while taking this medication. Many patients also find it helps to soak in a warm tub for at least 15 minutes at a time, three to four times a day if possible.  2) Everybody has some spotting or mild amounts of bleeding/bloody drainage.  If you see more significant bleeding, try holding some pressure over the wound for 15-20 minutes.   If you pass more than a cup full of blood or you cannot get the bleeding to stop by holding pressure, please call the office.  3) You may have a small amount of foam packing in your rectum. This will come out on its own with your first bowel movement.  4) Infections rarely occur in this area after surgery.  If you see small amounts of yellowish/pus like drainage in the days following surgery, this is expected.  It is also normal to run a low grade fever (below 101.5 F) following surgery.  5) You will feel numb in the anorectal area after surgery due to the numbing medication used in the operating room.  It is possible you may experience some fecal incontinence (accidental passage of gas/stool) during this time.  The numbness will resolve on its own.  Once you feel sensation returning, you should consider taking something for pain as you can expect oral medications to take 30-60 minutes before you start feeling their effects.  6) There is generally no specific  wound care necessary immediately after surgery.  Simply showering/bathing 1-2 times a day and after bowel movements is sufficient to keep the wounds clean.  You may want to keep a dry gauze/pad over the wound site as it is normal to have some amount of drainage, and this drainage can be irritating to the skin.  If you have a packing in the wound, your surgeon will give you more specific instructions on how to manage this.  7) For some operations you may have stitches in your wound.  Those stitches almost always break within a few days of surgery.  If you feel a pop or the wound opens - this is OK.  The wound will continue to fill in on its own.  It is still ok to shower/bathe as normal.  Expect some amount of drainage if the wound is open.  8) Do not allow yourself to go more than 2 or 3 days after surgery without having had a bowel movement.  If you wait too long, the first bowel movement may be unpleasant.  If you haven t had a bowel after 2 days, take something by mouth to help you go.  Here are a few options:  - Milk of Magnesia 30 mL every 8 hours as necessary  - Miralax 1-2 capfuls daily as necessary  - Senna 1-2 tabs twice a day as necessary  9) Your only activity restrictions are no driving while you are taking narcotics.  I would avoid heavy lifting after hemorrhoidectomy or THD for 2 weeks. You may want to avoid strenuous exercise for the first 1-2 days after surgery, but if you feel up to resuming normal activities/exercise, this is ok.  10) Lastly, if you have any questions or concerns - PLEASE CALL ([phone])!  Our office has someone on call 24 hours a day.  If your question is one that can wait until normal business hours (Mon-Fri 8:30AM-5PM), it is better to wait until the daytime as someone more familiar with your care will be able to help you.  However, if it is an emergency, the on-call surgeon will be able to give you good advice.    You should have arranged a post operative appointment when  surgery was scheduled, but if not or if you are unsure, please call the office at [phone] to arrange/schedule a post operative visit.

## 2023-10-05 NOTE — ANESTHESIA PREPROCEDURE EVALUATION
Anesthesia Pre-Procedure Evaluation    Patient: Roxy Ross   MRN: [id number] : 1970        Procedure : Procedure(s):  EXAM UNDER ANESTHESIA WITH EXCISION OF SKIN TAG, INTRAOPERATIVE COLONOSCOPY          Past Medical History:   Diagnosis Date    Arthritis     Endometriosis     seen in [place] at time of hyst    Fibroid tumors     s/p MERLYN    Ovarian cyst       Past Surgical History:   Procedure Laterality Date    ABDOMEN SURGERY  hysterectomy     COLONOSCOPY  2011    Procedure:COLONOSCOPY; Surgeon:HECTOR CONTRERAS; Location:[facility]    COLONOSCOPY N/A 2018    Procedure: COLONOSCOPY;  colonoscopy;  Surgeon: Oliver Cooney MD;  Location: [facility]    HC DILATION/CURETTAGE DIAG/THER NON OB      D & C    HYSTERECTOMY, PAP NO LONGER INDICATED      LAPAROSCOPIC CYSTECTOMY OVARIAN (BENIGN)  2012    Procedure:LAPAROSCOPIC CYSTECTOMY OVARIAN (BENIGN); Final Procedure Done: Laparoscopy, Peritoneal Washing, Lysis of Adhesions; Surgeon:ADRIANA REYNOLDS; [facility]    LAPAROSCOPY DIAGNOSTIC (GYN)  2012    Lysis of adhesions, evaluate ovaries    ZZC TOTAL ABDOM HYSTERECTOMY  2003    Hysterectomy, Total Abdominal, fibroid      Allergies   Allergen Reactions    Erythromycin [Macrolides And Ketolides] Hives     hives      Social History     Tobacco Use    Smoking status: Former     Packs/day: 0.00     Years: 5.00     Pack years: 0.00     Types: Cigarettes     Quit date: 1991     Years since quittin.1    Smokeless tobacco: Never    Tobacco comments:     that was as a teenager   Substance Use Topics    Alcohol use: Yes     Comment: beer and wine 4 a week      Wt Readings from Last 1 Encounters:   23 71.7 kg (158 lb)        Anesthesia Evaluation   Pt has had prior anesthetic. Type: MAC and General.    No history of anesthetic complications       ROS/MED HX  ENT/Pulmonary:       Neurologic:    (-) no seizures and no CVA   Cardiovascular:    (-)  CAD, CHF and ICD   METS/Exercise Tolerance:     Hematologic:       Musculoskeletal:       GI/Hepatic:    (-) liver disease   Renal/Genitourinary:    (-) renal disease   Endo:     (+)               Obesity,       Psychiatric/Substance Use:       Infectious Disease:       Malignancy:       Other:            Physical Exam    Airway        Mallampati: II   TM distance: > 3 FB   Neck ROM: full     Respiratory Devices and Support         Dental       (+) Minor Abnormalities - some fillings, tiny chips    B=Bridge, C=Chipped, L=Loose, M=Missing    Cardiovascular             Pulmonary                   OUTSIDE LABS:  CBC:   Lab Results   Component Value Date    WBC 5.8 03/23/2020    WBC 5.9 05/07/2019    HGB 14.1 03/23/2020    HGB 13.2 05/07/2019    HCT 43.5 03/23/2020    HCT 39.7 05/07/2019     03/23/2020     05/07/2019     BMP:   Lab Results   Component Value Date     (H) 03/24/2022    GLC 96 07/31/2020     COAGS: No results found for: PTT, INR, FIBR  POC: No results found for: BGM, HCG, HCGS  HEPATIC: No results found for: ALBUMIN, PROTTOTAL, ALT, AST, GGT, ALKPHOS, BILITOTAL, BILIDIRECT, KYLAH  OTHER:   Lab Results   Component Value Date    A1C 5.2 06/15/2023    TSH 3.94 04/07/2015    T4 0.95 05/13/2005    SED 4 05/07/2019       Anesthesia Plan    ASA Status:  2    NPO Status:  NPO Appropriate    Anesthesia Type: MAC.     - Reason for MAC: immobility needed      Maintenance: TIVA.        Consents    Anesthesia Plan(s) and associated risks, benefits, and realistic alternatives discussed. Questions answered and patient/representative(s) expressed understanding.     - Discussed: Risks, Benefits and Alternatives for BOTH SEDATION and the PROCEDURE were discussed     - Discussed with:             Postoperative Care    Pain management: IV analgesics, Oral pain medications.   PONV prophylaxis: Ondansetron (or other 5HT-3), Dexamethasone or Solumedrol     Comments:                Bárbara Joy MD

## 2023-10-05 NOTE — PROGRESS NOTES
The History and Physical has been reviewed, the patient has been examined and no changes have occurred in the patient's condition since the H & P was completed.     Natalia Ramos MD

## 2023-10-05 NOTE — ANESTHESIA POSTPROCEDURE EVALUATION
Patient: Roxy Ross    Procedure: Procedure(s):  EXAM UNDER ANESTHESIA WITH EXCISION OF SKIN TAG  INTRAOPERATIVE COLONOSCOPY,  POLYPECTOMY       Anesthesia Type:  MAC    Note:  Disposition: Outpatient   Postop Pain Control: Uneventful            Sign Out: Well controlled pain   PONV: No   Neuro/Psych: Uneventful            Sign Out: Acceptable/Baseline neuro status   Airway/Respiratory: Uneventful            Sign Out: Acceptable/Baseline resp. status   CV/Hemodynamics: Uneventful            Sign Out: Acceptable CV status; No obvious hypovolemia; No obvious fluid overload   Other NRE: NONE   DID A NON-ROUTINE EVENT OCCUR? No           Last vitals:  Vitals Value Taken Time   /57 10/05/23 1000   Temp 97.2  F (36.2  C) 10/05/23 0919   Pulse 50 10/05/23 1003   Resp 16 10/05/23 0919   SpO2 100 % 10/05/23 1003   Vitals shown include unvalidated device data.    Electronically Signed By: Bárbara Joy MD  October 5, 2023  12:15 PM

## 2024-02-08 ENCOUNTER — OFFICE VISIT (OUTPATIENT)
Dept: FAMILY MEDICINE | Facility: CLINIC | Age: 54
End: 2024-02-08
Payer: COMMERCIAL

## 2024-02-08 VITALS
OXYGEN SATURATION: 99 % | SYSTOLIC BLOOD PRESSURE: 118 MMHG | DIASTOLIC BLOOD PRESSURE: 80 MMHG | WEIGHT: 158 LBS | BODY MASS INDEX: 26.98 KG/M2 | HEART RATE: 58 BPM | RESPIRATION RATE: 20 BRPM | TEMPERATURE: 97.6 F | HEIGHT: 64 IN

## 2024-02-08 DIAGNOSIS — G89.29 LEFT FLANK PAIN, CHRONIC: ICD-10-CM

## 2024-02-08 DIAGNOSIS — R10.9 LEFT FLANK PAIN, CHRONIC: ICD-10-CM

## 2024-02-08 DIAGNOSIS — G89.29 CHRONIC LEFT-SIDED THORACIC BACK PAIN: Primary | ICD-10-CM

## 2024-02-08 DIAGNOSIS — M54.6 CHRONIC LEFT-SIDED THORACIC BACK PAIN: Primary | ICD-10-CM

## 2024-02-08 PROCEDURE — 99215 OFFICE O/P EST HI 40 MIN: CPT | Performed by: FAMILY MEDICINE

## 2024-02-08 ASSESSMENT — PAIN SCALES - GENERAL: PAINLEVEL: NO PAIN (1)

## 2024-02-08 NOTE — PROGRESS NOTES
"  Assessment & Plan     Chronic left-sided thoracic back pain  Left flank pain, chronic  Uncertain etiology, I suspect musculoskeletal   Due to its chronicity, will get CT scan and rule out underlying pathology  If normal, physical therapy   - CT Abdomen w/o Contrast; Future        I spent a total of 43 minutes on the day of the visit.   Time spent by me doing chart review, history and exam, documentation and further activities per the note      BMI  Estimated body mass index is 27.12 kg/m  as calculated from the following:    Height as of this encounter: 1.626 m (5' 4\").    Weight as of this encounter: 71.7 kg (158 lb).         Fabby Miller is a 53 year old, presenting for the following health issues:  Abdominal Pain      2/8/2024    12:43 PM   Additional Questions   Roomed by Daya     History of Present Illness       Back Pain:  She presents for follow up of back pain. Patient's back pain is a recurring problem.  Location of back pain:  Right lower back, left lower back and left middle of back  Description of back pain: dull ache  Back pain spreads: right side of neck    Since patient first noticed back pain, pain is: always present, but gets better and worse  Does back pain interfere with her job:  No       She eats 2-3 servings of fruits and vegetables daily.She consumes 0 sweetened beverage(s) daily.She exercises with enough effort to increase her heart rate 10 to 19 minutes per day.  She exercises with enough effort to increase her heart rate 3 or less days per week.   She is taking medications regularly.     Left neck and back pain  Works [employer] over  Has been for years  She has been working with a chiropractor and feels she is working on it, doing stretches and exercises.     But has a pain in left flank/lower mid back since 2019.   chest x-ray was negative in 2020.   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 But she just wants to make sure there is nothing serious going on. " "  She has tried to make her job more agronomical.  Pain is in a certain spot. Always there, but some times is much worse, and throbs. Movement makes worse. But sometimes is painful just sitting.   She worries there may be something deeper that is causing the problem and would really like to rule that out.   No nausea or vomiting.   No dysuria, urinary frequency or urgency.         Review of Systems  As above      Objective    /80 (BP Location: Right arm)   Pulse 58   Temp 97.6  F (36.4  C) (Tympanic)   Resp 20   Ht 1.626 m (5' 4\")   Wt 71.7 kg (158 lb)   LMP 02/01/2002   SpO2 99%   BMI 27.12 kg/m    Body mass index is 27.12 kg/m .  Physical Exam   GENERAL: alert and no distress  NECK: no adenopathy, no asymmetry, masses, or scars  RESP: lungs clear to auscultation - no rales, rhonchi or wheezes  CV: regular rate and rhythm, normal S1 S2, no S3 or S4, no murmur, click or rub, no peripheral edema  ABDOMEN: soft, nontender, no hepatosplenomegaly, no masses and bowel sounds normal  MS: no gross musculoskeletal defects noted, tender over low left rib cage posterior to axillary midline, movement doesn't seem to exacerbate it, difficult to ascertain if in ribs or muscle        Signed Electronically by: Salima Casillas MD    "

## 2024-03-07 ENCOUNTER — HOSPITAL ENCOUNTER (OUTPATIENT)
Dept: CT IMAGING | Facility: CLINIC | Age: 54
Discharge: HOME | End: 2024-03-07
Attending: FAMILY MEDICINE | Admitting: FAMILY MEDICINE
Payer: COMMERCIAL

## 2024-03-07 DIAGNOSIS — G89.29 CHRONIC LEFT-SIDED THORACIC BACK PAIN: ICD-10-CM

## 2024-03-07 DIAGNOSIS — G89.29 LEFT FLANK PAIN, CHRONIC: ICD-10-CM

## 2024-03-07 DIAGNOSIS — R10.9 LEFT FLANK PAIN, CHRONIC: ICD-10-CM

## 2024-03-07 DIAGNOSIS — M54.6 CHRONIC LEFT-SIDED THORACIC BACK PAIN: ICD-10-CM

## 2024-03-07 PROCEDURE — 74176 CT ABD & PELVIS W/O CONTRAST: CPT

## 2024-08-01 ENCOUNTER — OFFICE VISIT (OUTPATIENT)
Dept: FAMILY MEDICINE | Facility: CLINIC | Age: 54
End: 2024-08-01
Payer: COMMERCIAL

## 2024-08-01 VITALS
OXYGEN SATURATION: 100 % | TEMPERATURE: 97.5 F | WEIGHT: 152.8 LBS | RESPIRATION RATE: 20 BRPM | HEIGHT: 64 IN | SYSTOLIC BLOOD PRESSURE: 110 MMHG | BODY MASS INDEX: 26.09 KG/M2 | DIASTOLIC BLOOD PRESSURE: 80 MMHG | HEART RATE: 51 BPM

## 2024-08-01 DIAGNOSIS — M54.16 LUMBAR RADICULOPATHY: Primary | ICD-10-CM

## 2024-08-01 PROCEDURE — 99213 OFFICE O/P EST LOW 20 MIN: CPT | Performed by: FAMILY MEDICINE

## 2024-08-01 RX ORDER — METHYLPREDNISOLONE 16 MG/1
16 TABLET ORAL DAILY
COMMUNITY
Start: 2024-07-02 | End: 2024-08-15

## 2024-08-01 RX ORDER — ACETAMINOPHEN 325 MG/1
650 TABLET ORAL EVERY 4 HOURS PRN
COMMUNITY
Start: 2024-07-29 | End: 2024-08-15

## 2024-08-01 RX ORDER — METHOCARBAMOL 750 MG/1
750 TABLET, FILM COATED ORAL 3 TIMES DAILY
COMMUNITY
Start: 2024-07-29 | End: 2024-08-15

## 2024-08-01 ASSESSMENT — ENCOUNTER SYMPTOMS: BACK PAIN: 1

## 2024-08-01 ASSESSMENT — PAIN SCALES - GENERAL: PAINLEVEL: EXTREME PAIN (8)

## 2024-08-01 NOTE — PROGRESS NOTES
Assessment & Plan     Lumbar radiculopathy  54-year-old female presented with acute on chronic low back pain, radiating to lower legs, worsening, doing chiropractic therapy and was seen by [facility] recently, already had spinal x-ray, report unavailable.  Differentials discussed in detail including lumbar radiculopathy.  Management options discussed and MRI lumbar spine ordered for further evaluation.  Suggested to continue well hydration, over-the-counter oral/topical analgesia and spine  referral placed.  Patient understood and in agreement with above plan.  All questions answered.  - MR Lumbar Spine w/o Contrast; Future  - Spine  Referral; Future      Subjective   Angela is a 54 year old, presenting for the following health issues:  Back Pain        8/1/2024     3:32 PM   Additional Questions   Roomed by Mei FOLEY LPN   Accompanied by self     History of Present Illness       Back Pain:  She presents for follow up of back pain. Patient's back pain is a recurring problem.  Location of back pain:  Right lower back, left lower back and right hip  Description of back pain: dull ache, fullness and shooting  Back pain spreads: right thigh, left thigh, right foot and left foot    Since patient first noticed back pain, pain is: gradually worsening  Does back pain interfere with her job:  Yes       She eats 4 or more servings of fruits and vegetables daily.She consumes 1 sweetened beverage(s) daily.She exercises with enough effort to increase her heart rate 10 to 19 minutes per day.  She exercises with enough effort to increase her heart rate 3 or less days per week.   She is taking medications regularly.     Steroid is helping a little. Chiropractor would like MRI.      Review of Systems  Constitutional, HEENT, cardiovascular, pulmonary, gi and gu systems are negative, except as otherwise noted.      Objective    /80   Pulse 51   Temp 97.5  F (36.4  C) (Tympanic)   Resp 20   Ht  "1.626 m (5' 4\")   Wt 69.3 kg (152 lb 12.8 oz)   LMP 02/01/2002   SpO2 100%   BMI 26.23 kg/m    Body mass index is 26.23 kg/m .  Physical Exam     GENERAL: alert and no distress  EYES: Eyes grossly normal to inspection, PERRL and conjunctivae and sclerae normal  NECK: no adenopathy, no asymmetry, masses, or scars  RESP: lungs clear to auscultation - no rales, rhonchi or wheezes  CV: regular rates and rhythm, normal S1 S2, no S3 or S4, and no murmur, click or rub  ABDOMEN: soft, nontender  MSK: Subjective low back pain, no spinal/paraspinal tenderness, skin discoloration or swelling noted, right-sided SLR positive, right lower extremity strength 4+/5, gait slightly antalgic, peripheral pulses 3+  NEURO: Normal strength and tone, mentation intact and speech normal  PSYCH: mentation appears normal, affect normal/bright        Signed Electronically by: Eric Chaudhry MD    "

## 2024-08-14 ENCOUNTER — HOSPITAL ENCOUNTER (OUTPATIENT)
Dept: MRI IMAGING | Facility: CLINIC | Age: 54
Discharge: HOME | End: 2024-08-14
Attending: FAMILY MEDICINE | Admitting: FAMILY MEDICINE
Payer: COMMERCIAL

## 2024-08-14 DIAGNOSIS — M54.16 LUMBAR RADICULOPATHY: ICD-10-CM

## 2024-08-14 PROCEDURE — 72148 MRI LUMBAR SPINE W/O DYE: CPT

## 2024-08-15 ENCOUNTER — OFFICE VISIT (OUTPATIENT)
Dept: PALLIATIVE MEDICINE | Facility: OTHER | Age: 54
End: 2024-08-15
Attending: FAMILY MEDICINE
Payer: COMMERCIAL

## 2024-08-15 VITALS — HEART RATE: 56 BPM | DIASTOLIC BLOOD PRESSURE: 63 MMHG | SYSTOLIC BLOOD PRESSURE: 130 MMHG | OXYGEN SATURATION: 98 %

## 2024-08-15 DIAGNOSIS — M54.16 LUMBAR RADICULOPATHY: ICD-10-CM

## 2024-08-15 DIAGNOSIS — M47.817 LUMBOSACRAL SPONDYLOSIS WITHOUT MYELOPATHY: Primary | ICD-10-CM

## 2024-08-15 PROCEDURE — 99213 OFFICE O/P EST LOW 20 MIN: CPT | Performed by: STUDENT IN AN ORGANIZED HEALTH CARE EDUCATION/TRAINING PROGRAM

## 2024-08-15 PROCEDURE — 99204 OFFICE O/P NEW MOD 45 MIN: CPT | Performed by: STUDENT IN AN ORGANIZED HEALTH CARE EDUCATION/TRAINING PROGRAM

## 2024-08-15 ASSESSMENT — PAIN SCALES - GENERAL: PAINLEVEL: NO PAIN (1)

## 2024-08-15 NOTE — PATIENT INSTRUCTIONS
Procedures: Left L4 and L5 TFESI can be considered in future, hold off for now  Physical Therapy: Ordered  Diagnostic Studies: MRI lumbar spine reviewed  Medication Management:   Discussed management of future flares would be:  Medrol dosepak acutely  Gabapentin 300mg 3 times daily for subacute ongoing pain  Follow up: as needed    Quentin Broderick MD  Interventional Pain Medicine  [facility] Critical access hospital Number:  [phone]  Call with any questions about your care and for scheduling assistance.   Calls are returned Monday through Friday between 8 AM and 4:30 PM. We usually get back to you within 2 business days depending on the issue/request.    If we are prescribing your medications:  For opioid medication refills, call the clinic or send a iCouch message 7 days in advance.  Please include:  Name of requested medication  Name of the pharmacy.  For non-opioid medications, call your pharmacy directly to request a refill. Please allow 3-4 days to be processed.   Per [place] State Law:  All controlled substance prescriptions must be filled within 30 days of being written.    For those controlled substances allowing refills, pickup must occur within 30 days of last fill.      We believe regular attendance is key to your success in our program!    Any time you are unable to keep your appointment we ask that you call us at least 24 hours in advance to cancel.This will allow us to offer the appointment time to another patient.   Multiple missed appointments may lead to dismissal from the clinic.

## 2024-08-15 NOTE — NURSING NOTE
Patient presents to the clinic today for a follow up with Quentin Broderick MD  regarding Pain Management.           8/15/2024     2:14 PM   PEG Score   PEG Total Score 3.67      MR- [place] spine 08/14/2024-     [place] Cline MA  [facility]

## 2024-08-15 NOTE — PROGRESS NOTES
[facility] Med Spine Evaluation    Date of visit: 8/15/2024    Reason for consultation:    Roxy Ross is a 54 year old female who is seen in consultation today for medical spine evaluation.    PCP is [facility] - [facility].    Please see the [facility] health questionnaire which the patient completed and reviewed with me in detail.    Chief Complaint:    Chief Complaint   Patient presents with    Pain     Low back pain- middle. Radiates out- Right side more tense.        Pain history:  Roxy Ross is a 54 year old female presenting with a chief pain complaint of low back pain    Onset: 1 month ago, no known injury - noted the pain after a trip to [place]  Location and Radiation: midline low back with local radiation bilaterally and left lateral thigh, calf, dorsal foot, and digits 2-5  Provoking: bending forward  Palliating: standing  Quality: dull  Severity: 0-8/10  Timing: intermittent  Numbness: some tingling in lateral calf  Weakness: very briefly    Patient denies red flag symptoms of corresponding bowel/bladder symptoms, fever/chills, saddle anesthesia, profound motor loss, weight loss, or sudden unremitting increase in pain.    Current pain medications include:  Ibuprofen - helpful  Methocarbamol - helpful    Previous medication treatments included:  Medrol dosepak - helpful    Other treatments have included:  Roxy Ross has not been seen at a pain clinic in the past.    PT: none  Injections: none  Surgery: none    Past Medical History:  Past Medical History:   Diagnosis Date    Arthritis     Endometriosis 2003    seen in [place] at time of hyst    Fibroid tumors 2003    s/p MERLYN    Ovarian cyst      Patient Active Problem List    Diagnosis Date Noted    CARDIOVASCULAR SCREENING; LDL GOAL LESS THAN 160 03/04/2011     Priority: Medium    Family history of colon cancer 02/25/2011      Priority: Medium    External hemorrhoids 02/26/2008     Priority: Medium     Problem list name updated by automated process. Provider to review         Past Surgical History:  Past Surgical History:   Procedure Laterality Date    ABDOMEN SURGERY  hysterectomy 2002    COLONOSCOPY  04/11/2011    Procedure:COLONOSCOPY; Surgeon:HECTOR CONTRERAS; Location:[facility]    COLONOSCOPY N/A 09/11/2018    Procedure: COLONOSCOPY;  colonoscopy;  Surgeon: Oliver Cooney MD;  Location: [facility]    COLONOSCOPY N/A 10/5/2023    Procedure: INTRAOPERATIVE COLONOSCOPY,  POLYPECTOMY;  Surgeon: Natalia Ramos MD;  [facility]     DILATION/CURETTAGE DIAG/THER NON OB  1996    D & C    HEMORRHOIDECTOMY EXTERNAL N/A 10/5/2023    Procedure: EXAM UNDER ANESTHESIA WITH EXCISION OF SKIN TAG;  Surgeon: Natalia Ramos MD;  Location: [facility]    HYSTERECTOMY, PAP NO LONGER INDICATED      LAPAROSCOPIC CYSTECTOMY OVARIAN (BENIGN)  01/04/2012    Procedure:LAPAROSCOPIC CYSTECTOMY OVARIAN (BENIGN); Final Procedure Done: Laparoscopy, Peritoneal Washing, Lysis of Adhesions; Surgeon:ADRIANA REYNOLDS; Location: OR    LAPAROSCOPY DIAGNOSTIC (GYN)  01/04/2012    Lysis of adhesions, evaluate ovaries    ZZC TOTAL ABDOM HYSTERECTOMY  05/2003    Hysterectomy, Total Abdominal, fibroid     Medications:  Current Outpatient Medications   Medication Sig Dispense Refill    NO ACTIVE MEDICATIONS        Allergies:     Allergies   Allergen Reactions    Erythromycin [Macrolides And Ketolides] Hives     hives         Family history:  Family History   Problem Relation Age of Onset    Hypertension Mother     Thyroid Disease Mother     Lipids Father     Cancer - colorectal Father         age 72    Diabetes Father     Hypertension Father     Colon Cancer Father     Allergies Maternal Grandmother         asthma    Heart Disease Paternal Grandmother     Allergies Brother         asthma    Other Cancer Brother     Cancer Paternal Grandfather          stomach       Physical Exam:  Vitals:    08/15/24 1412   BP: 130/63   Pulse: 56   SpO2: 98%     Exam:  Constitutional: Well developed, NAD  Head: normocephalic. Atraumatic.   Eyes: no redness or jaundice noted   CV: warm, well perfused extremities   Skin: no suspicious lesions or rashes   Psychiatric: mentation appears normal and affect full    Neuromuscular Examination:  Normal ROM in lumbar flexion, extension  Mild TTP to bilateral lumbar paraspinals,  Negative SI joint tenderness on left, mildly positive on right  Normal 5/5 strength in BLE   Normal sensation to light touch in the L3-S1 distributions bilaterally except tingling at bilateral S1  No ankle clonus bilaterally    DIPTI: negative bilaterally   FADIR: negative bilaterally   Scour: negative bilaterally   Straight leg raise: negative bilaterally       Diagnostic tests:  MRI LUMBAR SPINE WITHOUT CONTRAST   8/14/2024 8:29 AM      HISTORY: Lumbar radiculopathy.      TECHNIQUE: Multiplanar multisequence MRI of the lumbar spine without  contrast.      COMPARISON: None.      FINDINGS: There are 5 lumbar-type vertebral bodies assumed for the  purposes of this dictation. The distal spinal cord and cauda equina  appear normal.      Alignment of the lumbar spine is normal. No loss of vertebral body  height. There are no destructive osseous lesions. No STIR hyperintense  endplate edema (Modic type I changes).     Level by level as follows:      T12-L1: No loss of disc height. No significant disc herniation. Normal  facets. No spinal canal or neural foraminal narrowing.      L1-L2: Mild loss of disc height and signal. Shallow circumferential  disc bulge. Normal facets. No spinal canal or neural foraminal  narrowing.      L2-L3: No loss of disc height. No significant disc herniation. Normal  facets. No spinal canal or neural foraminal narrowing.      L3-L4: No loss of disc height. No significant disc herniation. Normal  facets. No spinal canal or neural  foraminal narrowing.      L4-L5: Mild loss of disc height and signal. Shallow posterior disc  bulge. Normal facets. No spinal canal narrowing. No significant neural  foraminal narrowing.      L5-S1: Mild loss of disc height and signal. Shallow posterior disc  bulge. Central annular fissuring of the disc. Normal facets. No spinal  canal or neural foraminal narrowing.      Paraspinous soft tissues: Unremarkable.                                                                       IMPRESSION:    1. Mild degenerative disc changes in the lumbar spine with small disc  bulges at L1-2, L4-5, and L5-S1.  2. No significant spinal canal or neural foraminal narrowing at any  level.      NAVID LUEVANO MD        Personally reviewed imaging: yes    Assessment:  Lumbar radiculopathy  Lumbar spondylosis    Roxy Ross is a 54 year old female who presents with 1 month of midline low back pain with radiation down left lateral thigh, lateral calf, dorsal foot, consistent with known left L5 radiculopathy.  Examination is benign today, as patient reports pain has been improving.  She has tried ibuprofen, methocarbamol, Medrol Dosepak, all of which have been helpful.  We discussed the lumbar MRI which does show an L4-5 disc bulge that is deflecting the traversing L5 nerve root, and this may be the etiology of her radiating pain.  For now, we will not start any new medications.  I ordered physical therapy for the patient to engage in regularly.  We will see her back as needed.    Plan:  Diagnosis reviewed, treatment option addressed, and risk/benefits discussed.     Procedures: Left L4 and L5 TFESI can be considered in future, hold off for now  Physical Therapy: Ordered  Diagnostic Studies: MRI lumbar spine reviewed  Medication Management:   Discussed management of future flares would be:  Medrol dosepak acutely  Gabapentin 300mg 3 times daily for subacute ongoing pain  Follow up: as needed    Quentin Broderick  MD  Interventional Pain Medicine  [facility]

## 2024-08-25 ENCOUNTER — HEALTH MAINTENANCE LETTER (OUTPATIENT)
Age: 54
End: 2024-08-25

## 2024-11-07 ENCOUNTER — OFFICE VISIT (OUTPATIENT)
Dept: FAMILY MEDICINE | Facility: CLINIC | Age: 54
End: 2024-11-07
Payer: COMMERCIAL

## 2024-11-07 VITALS
BODY MASS INDEX: 26.29 KG/M2 | HEART RATE: 75 BPM | DIASTOLIC BLOOD PRESSURE: 81 MMHG | TEMPERATURE: 97.8 F | WEIGHT: 154 LBS | HEIGHT: 64 IN | OXYGEN SATURATION: 99 % | SYSTOLIC BLOOD PRESSURE: 118 MMHG | RESPIRATION RATE: 20 BRPM

## 2024-11-07 DIAGNOSIS — K92.1 BLOODY STOOL: Primary | ICD-10-CM

## 2024-11-07 DIAGNOSIS — K60.2 ANAL FISSURE: ICD-10-CM

## 2024-11-07 LAB — HEMOCCULT STL QL: NEGATIVE

## 2024-11-07 PROCEDURE — 90471 IMMUNIZATION ADMIN: CPT | Performed by: NURSE PRACTITIONER

## 2024-11-07 PROCEDURE — 90673 RIV3 VACCINE NO PRESERV IM: CPT | Performed by: NURSE PRACTITIONER

## 2024-11-07 PROCEDURE — 82272 OCCULT BLD FECES 1-3 TESTS: CPT | Performed by: NURSE PRACTITIONER

## 2024-11-07 PROCEDURE — 99213 OFFICE O/P EST LOW 20 MIN: CPT | Mod: 25 | Performed by: NURSE PRACTITIONER

## 2024-11-07 ASSESSMENT — PAIN SCALES - GENERAL: PAINLEVEL_OUTOF10: MILD PAIN (2)

## 2024-11-07 NOTE — PROGRESS NOTES
"  Assessment & Plan     Bloody stool  Notes has had few bloody stools over the past month without abd pain, today seemed to be more. Did not see blood on tissue. Mild rectal pain. Hemoccult today is negative and there is evidence of an irritated hemorrhoid and fissure on exam. Colonoscopy last year with 2 polyps, plan for repeat in 2026. She will monitor symptoms and if not improving, we will order repeat colonoscopy sooner.  - Occult blood stool    Anal fissure  Small fissure on exam today. She has barrier cream at home she was prescribed from previous fissure, she will start this, sitz baths.          BMI  Estimated body mass index is 26.43 kg/m  as calculated from the following:    Height as of this encounter: 1.626 m (5' 4\").    Weight as of this encounter: 69.9 kg (154 lb).         See Patient Instructions    Fabby Miller is a 54 year old, presenting for the following health issues:  Rectal Problem      11/7/2024     2:14 PM   Additional Questions   Roomed by Daya     History of Present Illness       Reason for visit:  Blood in stool  Symptom onset:  3-4 weeks ago  Symptoms include:  Blood and back pain  Symptom intensity:  Moderate  Symptom progression:  Staying the same  Had these symptoms before:  Yes  Has tried/received treatment for these symptoms:  No  What makes it better:  No   She is taking medications regularly.             Review of Systems  Constitutional, neuro, ENT, endocrine, pulmonary, cardiac, gastrointestinal, genitourinary, musculoskeletal, integument and psychiatric systems are negative, except as otherwise noted.      Objective    /81 (BP Location: Right arm)   Pulse 75   Temp 97.8  F (36.6  C) (Tympanic)   Resp 20   Ht 1.626 m (5' 4\")   Wt 69.9 kg (154 lb)   LMP 02/01/2002   SpO2 99%   BMI 26.43 kg/m    Body mass index is 26.43 kg/m .  Physical Exam  Vitals and nursing note reviewed.   Constitutional:       General: She is not in acute distress.     Appearance: Normal " appearance.   HENT:      Head: Normocephalic and atraumatic.      Mouth/Throat:      Mouth: Mucous membranes are moist.   Cardiovascular:      Rate and Rhythm: Normal rate.   Pulmonary:      Effort: Pulmonary effort is normal.   Abdominal:      Palpations: Abdomen is soft.      Tenderness: There is no abdominal tenderness.   Genitourinary:     Rectum: Guaiac result negative. Anal fissure and external hemorrhoid present. No mass or tenderness.   Musculoskeletal:      Cervical back: Neck supple.   Skin:     General: Skin is warm and dry.   Neurological:      General: No focal deficit present.      Mental Status: She is alert.   Psychiatric:         Mood and Affect: Mood normal.         Behavior: Behavior normal.            Results for orders placed or performed in visit on 11/07/24 (from the past 24 hours)   Occult blood stool   Result Value Ref Range    Occult Blood Negative Negative           Signed Electronically by: RUBÉN iWn CNP

## 2024-12-12 ENCOUNTER — PATIENT OUTREACH (OUTPATIENT)
Dept: CARE COORDINATION | Facility: CLINIC | Age: 54
End: 2024-12-12
Payer: COMMERCIAL

## 2025-08-23 ENCOUNTER — HEALTH MAINTENANCE LETTER (OUTPATIENT)
Age: 55
End: 2025-08-23

## (undated) RX ORDER — LIDOCAINE HYDROCHLORIDE 10 MG/ML
INJECTION, SOLUTION EPIDURAL; INFILTRATION; INTRACAUDAL; PERINEURAL
Status: DISPENSED
Start: 2018-09-11